# Patient Record
Sex: FEMALE | Race: BLACK OR AFRICAN AMERICAN | NOT HISPANIC OR LATINO | Employment: FULL TIME | URBAN - METROPOLITAN AREA
[De-identification: names, ages, dates, MRNs, and addresses within clinical notes are randomized per-mention and may not be internally consistent; named-entity substitution may affect disease eponyms.]

---
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
## 2021-02-05 ENCOUNTER — OFFICE VISIT - HEALTHEAST (OUTPATIENT)
Dept: FAMILY MEDICINE | Facility: CLINIC | Age: 21
End: 2021-02-05

## 2021-02-05 DIAGNOSIS — Z86.2 H/O: IRON DEFICIENCY ANEMIA: ICD-10-CM

## 2021-02-05 DIAGNOSIS — Z80.3 FAMILY HISTORY OF BREAST CANCER IN MOTHER: ICD-10-CM

## 2021-02-05 DIAGNOSIS — Z00.00 ROUTINE GENERAL MEDICAL EXAMINATION AT A HEALTH CARE FACILITY: ICD-10-CM

## 2021-02-05 DIAGNOSIS — F32.0 MILD MAJOR DEPRESSION (H): ICD-10-CM

## 2021-02-05 DIAGNOSIS — Z11.3 SCREEN FOR STD (SEXUALLY TRANSMITTED DISEASE): ICD-10-CM

## 2021-02-05 DIAGNOSIS — Z30.09 ENCOUNTER FOR COUNSELING REGARDING CONTRACEPTION: ICD-10-CM

## 2021-02-05 LAB
FASTING STATUS PATIENT QL REPORTED: NORMAL
GLUCOSE BLD-MCNC: 87 MG/DL (ref 74–125)
HGB BLD-MCNC: 13.2 G/DL (ref 12–16)

## 2021-02-05 RX ORDER — NORGESTIMATE AND ETHINYL ESTRADIOL 0.25-0.035
1 KIT ORAL DAILY
Qty: 3 PACKAGE | Refills: 4 | Status: SHIPPED | OUTPATIENT
Start: 2021-02-05 | End: 2022-04-27

## 2021-02-05 ASSESSMENT — ANXIETY QUESTIONNAIRES
4. TROUBLE RELAXING: NOT AT ALL
GAD7 TOTAL SCORE: 1
1. FEELING NERVOUS, ANXIOUS, OR ON EDGE: NOT AT ALL
5. BEING SO RESTLESS THAT IT IS HARD TO SIT STILL: NOT AT ALL
6. BECOMING EASILY ANNOYED OR IRRITABLE: NOT AT ALL
7. FEELING AFRAID AS IF SOMETHING AWFUL MIGHT HAPPEN: NOT AT ALL
IF YOU CHECKED OFF ANY PROBLEMS ON THIS QUESTIONNAIRE, HOW DIFFICULT HAVE THESE PROBLEMS MADE IT FOR YOU TO DO YOUR WORK, TAKE CARE OF THINGS AT HOME, OR GET ALONG WITH OTHER PEOPLE: NOT DIFFICULT AT ALL
3. WORRYING TOO MUCH ABOUT DIFFERENT THINGS: SEVERAL DAYS
2. NOT BEING ABLE TO STOP OR CONTROL WORRYING: NOT AT ALL

## 2021-02-05 ASSESSMENT — MIFFLIN-ST. JEOR: SCORE: 1345.81

## 2021-02-05 ASSESSMENT — PATIENT HEALTH QUESTIONNAIRE - PHQ9: SUM OF ALL RESPONSES TO PHQ QUESTIONS 1-9: 3

## 2021-02-09 LAB
C TRACH DNA SPEC QL PROBE+SIG AMP: NEGATIVE
N GONORRHOEA DNA SPEC QL NAA+PROBE: NEGATIVE

## 2021-02-15 ENCOUNTER — OFFICE VISIT - HEALTHEAST (OUTPATIENT)
Dept: ONCOLOGY | Facility: HOSPITAL | Age: 21
End: 2021-02-15

## 2021-02-15 DIAGNOSIS — Z71.83 ENCOUNTER FOR NONPROCREATIVE GENETIC COUNSELING: ICD-10-CM

## 2021-02-15 DIAGNOSIS — Z80.3 FAMILY HISTORY OF MALIGNANT NEOPLASM OF BREAST: ICD-10-CM

## 2021-03-09 ENCOUNTER — COMMUNICATION - HEALTHEAST (OUTPATIENT)
Dept: ONCOLOGY | Facility: HOSPITAL | Age: 21
End: 2021-03-09

## 2021-03-22 ENCOUNTER — COMMUNICATION - HEALTHEAST (OUTPATIENT)
Dept: FAMILY MEDICINE | Facility: CLINIC | Age: 21
End: 2021-03-22

## 2021-04-23 ENCOUNTER — OFFICE VISIT - HEALTHEAST (OUTPATIENT)
Dept: FAMILY MEDICINE | Facility: CLINIC | Age: 21
End: 2021-04-23

## 2021-04-23 DIAGNOSIS — T74.21XD SEXUAL ASSAULT OF ADULT, SUBSEQUENT ENCOUNTER: ICD-10-CM

## 2021-04-23 DIAGNOSIS — N89.8 VAGINAL ITCHING: ICD-10-CM

## 2021-04-23 DIAGNOSIS — N76.0 BV (BACTERIAL VAGINOSIS): ICD-10-CM

## 2021-04-23 DIAGNOSIS — B96.89 BV (BACTERIAL VAGINOSIS): ICD-10-CM

## 2021-04-23 DIAGNOSIS — F32.0 MILD MAJOR DEPRESSION (H): ICD-10-CM

## 2021-04-23 LAB
CLUE CELLS: ABNORMAL
TRICHOMONAS, WET PREP: ABNORMAL
YEAST, WET PREP: ABNORMAL

## 2021-04-23 RX ORDER — DOLUTEGRAVIR SODIUM 50 MG/1
50 TABLET, FILM COATED ORAL DAILY
Status: SHIPPED | COMMUNITY
Start: 2021-04-14 | End: 2022-04-27

## 2021-04-23 ASSESSMENT — ANXIETY QUESTIONNAIRES
3. WORRYING TOO MUCH ABOUT DIFFERENT THINGS: SEVERAL DAYS
4. TROUBLE RELAXING: NOT AT ALL
1. FEELING NERVOUS, ANXIOUS, OR ON EDGE: NOT AT ALL
2. NOT BEING ABLE TO STOP OR CONTROL WORRYING: SEVERAL DAYS
IF YOU CHECKED OFF ANY PROBLEMS ON THIS QUESTIONNAIRE, HOW DIFFICULT HAVE THESE PROBLEMS MADE IT FOR YOU TO DO YOUR WORK, TAKE CARE OF THINGS AT HOME, OR GET ALONG WITH OTHER PEOPLE: NOT DIFFICULT AT ALL
6. BECOMING EASILY ANNOYED OR IRRITABLE: SEVERAL DAYS
5. BEING SO RESTLESS THAT IT IS HARD TO SIT STILL: NOT AT ALL
7. FEELING AFRAID AS IF SOMETHING AWFUL MIGHT HAPPEN: SEVERAL DAYS
GAD7 TOTAL SCORE: 4

## 2021-04-23 ASSESSMENT — PATIENT HEALTH QUESTIONNAIRE - PHQ9: SUM OF ALL RESPONSES TO PHQ QUESTIONS 1-9: 8

## 2021-04-26 ENCOUNTER — COMMUNICATION - HEALTHEAST (OUTPATIENT)
Dept: FAMILY MEDICINE | Facility: CLINIC | Age: 21
End: 2021-04-26

## 2021-04-27 LAB
C TRACH DNA SPEC QL PROBE+SIG AMP: NEGATIVE
N GONORRHOEA DNA SPEC QL NAA+PROBE: NEGATIVE

## 2021-05-05 ENCOUNTER — AMBULATORY - HEALTHEAST (OUTPATIENT)
Dept: LAB | Facility: CLINIC | Age: 21
End: 2021-05-05

## 2021-05-05 DIAGNOSIS — T74.21XD SEXUAL ASSAULT OF ADULT, SUBSEQUENT ENCOUNTER: ICD-10-CM

## 2021-05-05 LAB — HIV 1+2 AB+HIV1 P24 AG SERPL QL IA: NEGATIVE

## 2021-05-06 LAB
HBV SURFACE AG SERPL QL IA: NEGATIVE
HCV AB SERPL QL IA: NEGATIVE
T PALLIDUM AB SER QL: NEGATIVE

## 2021-05-10 ENCOUNTER — COMMUNICATION - HEALTHEAST (OUTPATIENT)
Dept: FAMILY MEDICINE | Facility: CLINIC | Age: 21
End: 2021-05-10

## 2021-05-18 ENCOUNTER — COMMUNICATION - HEALTHEAST (OUTPATIENT)
Dept: FAMILY MEDICINE | Facility: CLINIC | Age: 21
End: 2021-05-18

## 2021-05-18 DIAGNOSIS — N76.0 BV (BACTERIAL VAGINOSIS): ICD-10-CM

## 2021-05-18 DIAGNOSIS — B96.89 BV (BACTERIAL VAGINOSIS): ICD-10-CM

## 2021-05-18 RX ORDER — METRONIDAZOLE 7.5 MG/G
GEL VAGINAL
Qty: 70 G | Refills: 3 | Status: SHIPPED | OUTPATIENT
Start: 2021-05-18 | End: 2022-04-27

## 2021-05-26 ASSESSMENT — PATIENT HEALTH QUESTIONNAIRE - PHQ9: SUM OF ALL RESPONSES TO PHQ QUESTIONS 1-9: 13

## 2021-05-27 ASSESSMENT — PATIENT HEALTH QUESTIONNAIRE - PHQ9
SUM OF ALL RESPONSES TO PHQ QUESTIONS 1-9: 3
SUM OF ALL RESPONSES TO PHQ QUESTIONS 1-9: 8
SUM OF ALL RESPONSES TO PHQ QUESTIONS 1-9: 7

## 2021-05-28 ASSESSMENT — ANXIETY QUESTIONNAIRES
GAD7 TOTAL SCORE: 8
GAD7 TOTAL SCORE: 1
GAD7 TOTAL SCORE: 7
GAD7 TOTAL SCORE: 4

## 2021-05-28 NOTE — PATIENT INSTRUCTIONS - HE
Dear Joni,    Irregular bleeding is the most common side effect of explained on contraceptive  But as the bleeding is little better than before and less painful I will still recommend continue the implant but for now we cannot try higher dose of estrogen 2 mg tablet 1 tablet every day for the next 10 days with 600 mg of IBU three times per day  For next 3-4 days and see if it stops the bleeding if none  the things working then we will remove the implant in next 2 weeks    Take iron supplement every day as hemoglobin still     Lab Results   Component Value Date    WBC 4.4 04/29/2019    HGB 11.1 (L) 04/29/2019    HCT 35.1 04/29/2019    MCV 72 (L) 04/29/2019     04/29/2019         Lulu Hawk MD 5/16/2019 8:48 AM     Oxybutynin Counseling:  I discussed with the patient the risks of oxybutynin including but not limited to skin rash, drowsiness, dry mouth, difficulty urinating, and blurred vision.

## 2021-05-28 NOTE — PATIENT INSTRUCTIONS - HE
Dear Joni,    Irregular bleeding is the most common side effect of explained on contraceptive  But as the bleeding is little better than before and less painful I will still recommend continue the implant but for now we cannot try estrogen tablet 1 tablet every day for the next 10 days and see if it stops the bleeding anytime you have heavy bleeding take some ibuprofen 600 mg every 6 hour and that also cut down the amount of bleeding if none  the things working then we will remove the implant in next 2 weeks    Take iron supplement every day as hemoglobin still     Lab Results   Component Value Date    WBC 4.4 04/29/2019    HGB 11.1 (L) 04/29/2019    HCT 35.1 04/29/2019    MCV 72 (L) 04/29/2019     04/29/2019         Lulu Hawk MD 4/29/2019 8:48 AM

## 2021-05-28 NOTE — PROGRESS NOTES
Assessment/plan   Joni Garcia is a 19 y.o. female who is  establish patient to my practice here with   Chief Complaint   Patient presents with     Follow-up     Nexplanon - pt was prescribed meds last visit to help control her bleeding w/o any relief, pt is still having extended menstruals        Joni was seen today for contraception.    Diagnoses and all orders for this visit:    Polymenorrhea  -     HM2(CBC w/o Differential)  -     Thyroid Stimulating Hormone (TSH)  -     estradiol (ESTRACE) 1 MG tablet; Take 1 tablet (1 mg total) by mouth daily for 10 days.    Nexplanon in place    Iron deficiency anemia due to chronic blood loss  Comments:  Prolong irregular bleeding       Patient Instructions       Dear Joni,    Irregular bleeding is the most common side effect of explained on contraceptive  But as the bleeding is little better than before and less painful I will still recommend continue the implant but for now we cannot try higher dose of estrogen 2 mg tablet 1 tablet every day for the next 10 days with 600 mg of IBU three times per day  For next 3-4 days and see if it stops the bleeding if none  the things working then we will remove the implant in next 2 weeks    Take iron supplement every day as hemoglobin still     Lab Results   Component Value Date    WBC 4.4 04/29/2019    HGB 11.1 (L) 04/29/2019    HCT 35.1 04/29/2019    MCV 72 (L) 04/29/2019     04/29/2019         Lulu Hawk MD 5/16/2019 8:48 AM          Subjective:      HPI: Joni Garcia is a 19 y.o. female is here for for f/u after we did 10 days of estrogen , for irregular bleeding since we placed the Nexplanon last year, she felt no side effect , feel bleeding did slow down little but still has to use 2 pads      Nexplanon was placed because of the heavy irregular painful cycle per patient they are little bit better than before but still spot every day, denies any heavy bleeding with clots only used one tampon every day, and no  dysmenorrhea.  Last hemoglobin when rechecked was 10.4 it did improve to 11.1 so most likely not very heavy bleeding  Patient currently not sexually active only purpose for Nexplanon was to control her bleeding and PMS symptoms     I have personally reviewed the patient's allergies, medications, past medical history, family history, social history, rooming notes and problem list in detail and updated the patient record as necessary.      No past medical history on file.  No past surgical history on file.  Patient has no known allergies.  Current Outpatient Medications   Medication Sig Dispense Refill     etonogestrel (NEXPLANON) 68 mg Impl implant 1 each by Subdermal route once.       estradiol (ESTRACE) 2 MG tablet Take 1 tablet (2 mg total) by mouth daily. 10 tablet 0     ferrous sulfate 325 (65 FE) MG tablet Take 1 tablet (325 mg total) by mouth daily with breakfast. 60 tablet 3     ibuprofen (ADVIL,MOTRIN) 600 MG tablet Take 1 tablet (600 mg total) by mouth 3 (three) times a day. 90 tablet 2     No current facility-administered medications for this visit.      No family history on file.    Patient Active Problem List   Diagnosis     Polymenorrhea     Anemia     Nexplanon insertion       Review of Systems   12 point comprehensive review of systems was negative except as noted and HPI     Social History     Social History Narrative    Dad- Alfonzo Wilhelm       Objective:     Vitals:    05/16/19 1526   BP: 108/66   Pulse: 76   Weight: 126 lb 6.4 oz (57.3 kg)       Physical Exam:   Physical Exam:  General Appearance:  Appears comfortable, Alert, cooperative, no distress,   Heart: Regular rate and rhythm, S1 and S2 normal, no murmur, rubs or gallop  Abdomen: Soft, non-tender, bowel sounds active all four quadrants,   no masses, no organomegaly  Extremities: Extremities normal, atraumatic, no cyanosis or edema  Pulses: DP pulses are 1-2+ bilat.    Skin: no rashes or lesions          This note has been  dictated using voice recognition software. Any grammatical or context distortions are unintentional and inherent to the software  Lulu Hawk MD

## 2021-05-28 NOTE — PROGRESS NOTES
Assessment/plan   Joni Garcia is a 19 y.o. female who is  establish patient to my practice here with   Chief Complaint   Patient presents with     Contraception     discuss options; currently on Nexplanon and has extended menstruals xmonths        Joni was seen today for contraception.    Diagnoses and all orders for this visit:    Polymenorrhea  -     HM2(CBC w/o Differential)  -     Thyroid Stimulating Hormone (TSH)  -     estradiol (ESTRACE) 1 MG tablet; Take 1 tablet (1 mg total) by mouth daily for 10 days.    Nexplanon in place    Iron deficiency anemia due to chronic blood loss  Comments:  Prolong irregular bleeding           Patient Instructions       Dear Joni,    Irregular bleeding is the most common side effect of explained on contraceptive  But as the bleeding is little better than before and less painful I will still recommend continue the implant but for now we cannot try estrogen tablet 1 tablet every day for the next 10 days and see if it stops the bleeding anytime you have heavy bleeding take some ibuprofen 600 mg every 6 hour and that also cut down the amount of bleeding if none of  the things working then we will remove the implant in next 2 weeks    Take iron supplement every day as hemoglobin still     Lulu Hawk MD 4/29/2019 8:48 AM      Subjective:      HPI: Joni Garcia is a 19 y.o. female is here for irregular bleeding since we placed the Nexplanon last year, Nexplanon was placed because of the heavy irregular painful cycle per patient they are little bit better than before but still spot every day, denies any heavy bleeding with clots only used one tampon every day, and no dysmenorrhea.  Last hemoglobin when rechecked was 10.4 it did improve to 11.1 so most likely not very heavy bleeding  Patient currently not sexually active only purpose for Nexplanon was to control her bleeding          I have personally reviewed the patient's allergies, medications, past medical history, family  history, social history, rooming notes and problem list in detail and updated the patient record as necessary.      No past medical history on file.  No past surgical history on file.  Patient has no known allergies.  Current Outpatient Medications   Medication Sig Dispense Refill     etonogestrel (NEXPLANON) 68 mg Impl implant 1 each by Subdermal route once.       estradiol (ESTRACE) 1 MG tablet Take 1 tablet (1 mg total) by mouth daily for 10 days. 10 tablet 11     ferrous sulfate 325 (65 FE) MG tablet Take 1 tablet (325 mg total) by mouth daily with breakfast. 60 tablet 3     No current facility-administered medications for this visit.      No family history on file.    Patient Active Problem List   Diagnosis     Polymenorrhea     Anemia     Nexplanon insertion       Review of Systems   12 point comprehensive review of systems was negative except as noted and HPI     Social History     Social History Narrative    Dad- Alfonzo Wilhelm       Objective:     Vitals:    04/29/19 0800   BP: 116/76   Pulse: 62   Weight: 123 lb 4.8 oz (55.9 kg)       Physical Exam:   Physical Exam:  General Appearance:  Appears comfortable, Alert, cooperative, no distress,   Heart: Regular rate and rhythm, S1 and S2 normal, no murmur, rubs or gallop  Abdomen: Soft, non-tender, bowel sounds active all four quadrants,   no masses, no organomegaly  Extremities: Extremities normal, atraumatic, no cyanosis or edema  Pulses: DP pulses are 1-2+ bilat.    Skin: no rashes or lesions          This note has been dictated using voice recognition software. Any grammatical or context distortions are unintentional and inherent to the software  Lulu Hawk MD

## 2021-05-30 VITALS — WEIGHT: 125.3 LBS | BODY MASS INDEX: 22.55 KG/M2

## 2021-05-31 VITALS — WEIGHT: 128.3 LBS

## 2021-05-31 NOTE — PROGRESS NOTES
Assessment/plan   Joni Garcia is a 19 y.o. female who is establish patient to my practice here with   Chief Complaint   Patient presents with     Contraception     nexplanon removal - prolonged menstrual since placement        Joni was seen today for contraception.    Diagnoses and all orders for this visit:    Nexplanon removal    Breakthrough bleeding on Nexplanon             Procedure Note Nexplanon removal:  Skin of left upper inner arm prepped in sterile fashion after consent form was signed and pt's questions were answered.  1% xylocaine with epinephrine used for local anesthesia.  Incision made with 15 blade at site of Nexplanon insertion. Implant was grasped with forceps and removed with minimal difficulty.  Pt tolerated procedure well, no complications.   A dressing was placed and she was instructed to leave it on for 24 hours and then remove it and treat this as normal skin.    Subjective:      HPI: Joni Garcia is a 19 y.o. female is here  to have Nexplanon removal, patient has this in place for last 6-month continued to bleed since then we tried ibuprofen, estrogen therapy but none of that able to help the symptoms.  Planning to move into dorm by end of the month and like to have it removed so she does not have to worry about having cycles every single day        I have personally reviewed the patient's allergies, medications, past medical history, family history, social history, rooming notes and problem list in detail and updated the patient record as necessary.      No past medical history on file.  No past surgical history on file.  Patient has no known allergies.  Current Outpatient Medications   Medication Sig Dispense Refill     ferrous sulfate 325 (65 FE) MG tablet Take 1 tablet (325 mg total) by mouth daily with breakfast. 60 tablet 3     ibuprofen (ADVIL,MOTRIN) 600 MG tablet Take 1 tablet (600 mg total) by mouth 3 (three) times a day. 90 tablet 2     No current facility-administered  medications for this visit.      No family history on file.    Patient Active Problem List   Diagnosis     Polymenorrhea     Anemia     Nexplanon insertion       Review of Systems   12 point comprehensive review of systems was negative except as noted and HPI     Social History     Social History Narrative    Dad- Alfonzo Wilhelm       Objective:     Vitals:    08/05/19 1154   BP: 122/84   Pulse: 68   Weight: 126 lb 9.6 oz (57.4 kg)       Physical Exam:   Physical Exam:  General Appearance:  Appears comfortable, Alert, cooperative, no distress,      This note has been dictated using voice recognition software. Any grammatical or context distortions are unintentional and inherent to the software  Lulu Hawk MD

## 2021-06-01 VITALS — WEIGHT: 126.9 LBS | BODY MASS INDEX: 22.48 KG/M2 | HEIGHT: 63 IN

## 2021-06-03 VITALS — WEIGHT: 126.6 LBS | BODY MASS INDEX: 22.72 KG/M2

## 2021-06-03 VITALS — BODY MASS INDEX: 22.12 KG/M2 | WEIGHT: 123.3 LBS

## 2021-06-03 VITALS — WEIGHT: 126.4 LBS | BODY MASS INDEX: 22.68 KG/M2

## 2021-06-04 VITALS
HEART RATE: 72 BPM | DIASTOLIC BLOOD PRESSURE: 66 MMHG | HEIGHT: 63 IN | BODY MASS INDEX: 22.73 KG/M2 | WEIGHT: 128.3 LBS | SYSTOLIC BLOOD PRESSURE: 100 MMHG

## 2021-06-04 VITALS — BODY MASS INDEX: 22.22 KG/M2 | WEIGHT: 127 LBS

## 2021-06-05 VITALS
BODY MASS INDEX: 22.53 KG/M2 | TEMPERATURE: 98.1 F | WEIGHT: 132 LBS | HEART RATE: 65 BPM | DIASTOLIC BLOOD PRESSURE: 62 MMHG | SYSTOLIC BLOOD PRESSURE: 90 MMHG | HEIGHT: 64 IN

## 2021-06-05 VITALS
SYSTOLIC BLOOD PRESSURE: 108 MMHG | DIASTOLIC BLOOD PRESSURE: 64 MMHG | BODY MASS INDEX: 23.28 KG/M2 | WEIGHT: 133.5 LBS | HEART RATE: 76 BPM

## 2021-06-05 NOTE — PROGRESS NOTES
Assessment/plan   Joni Garcia is a 19 y.o. female who is  establish patient to my practice here with   Chief Complaint   Patient presents with     Vaginal Discharge     and odor, lump on right side groin; Sx have been present since 09/2019 w/o relief and denies any urinary Sx         Joni was seen today for vaginal discharge.    Diagnoses and all orders for this visit:    Vaginal discharge  -     Wet Prep, Vaginal    Inguinal lymphadenopathy  Comments:  benign most likely from folliculitis from shaving     wet prep neg, preventive measures including probiotic etc. To help presumptive bacterial vaginosis based on symptoms   Adv to call when she has symptoms and not on her cycle so more likely diagnose       Subjective:      HPI: Joni Garcia is a 19 y.o. female is here for    Vaginitis: Patient complains of an abnormal vaginal discharge for several months symptoms are more prominent after her menstrual bleeding. Vaginal symptoms include local irritation and vulvar itching.Vulvar symptoms include local irritation and odor.STI Risk: Very low risk of STD exposure never sexually active Discharge described as: white, thin and malodorous.Other associated symptoms: none.Menstrual pattern: She had been bleeding regularly. Contraception: none is a concern about small inguinal lymph node on the right side most likely coming from her folliculitis from shaving nontender smooth mobile  Patient had history of continuous bleeding on nexplanon which got removed in last 2 month since then regular cycles     Patient's last menstrual period was 01/05/2020 (exact date).       I have personally reviewed the patient's allergies, medications, past medical history, family history, social history, rooming notes and problem list in detail and updated the patient record as necessary.      No past medical history on file.  No past surgical history on file.  Patient has no known allergies.  Current Outpatient Medications   Medication Sig  "Dispense Refill     ferrous sulfate 325 (65 FE) MG tablet Take 1 tablet (325 mg total) by mouth daily with breakfast. 60 tablet 3     No current facility-administered medications for this visit.      No family history on file.    Patient Active Problem List   Diagnosis     Polymenorrhea     Anemia     Nexplanon insertion       Review of Systems   12 point comprehensive review of systems was negative except as noted and HPI     Social History     Social History Narrative    Dominga- Alfonzo Wilhelm       Objective:     Vitals:    01/07/20 0902   BP: 100/66   Pulse: 72   Weight: 128 lb 4.8 oz (58.2 kg)   Height: 5' 3.39\" (1.61 m)       Physical Exam:   Physical Exam:  General Appearance:  Appears comfortable, Alert, cooperative, no distress,   Neck: Supple, symmetrical, trachea midline, no adenopathy;                      Lungs: Clear to auscultation bilaterally, respirations unlabored  Pelvic exam: VULVA: normal appearing vulva with no masses, tenderness or lesions, VAGINA: normal appearing vagina with normal color and discharge, no lesions, WET MOUNT done - actively bleeding today   Non tender 0.5X0.5 cm LN right inguinal area mobile   Heart: Regular rate and rhythm, S1 and S2 normal, no murmur, rubs or gallop  Abdomen: Soft, non-tender, bowel sounds active all four quadrants,   no masses, no organomegaly  Extremities: Extremities normal, atraumatic, no cyanosis or edema  Pulses: DP pulses are 1-2+ bilat.    Skin: no rashes or lesions  Neurologic: normal and equal strength bilat in upper and lower extremities        This note has been dictated using voice recognition software. Any grammatical or context distortions are unintentional and inherent to the software  Lulu Hawk MD      "

## 2021-06-07 NOTE — PROGRESS NOTES
"Assessment/plan   Joni Garcia is a 20 y.o. female  who is being evaluated via a billable telephone visit.      The patient has been notified of following:     \"This telephone visit will be conducted via a call between you and your physician/provider. We have found that certain health care needs can be provided without the need for a physical exam.  This service lets us provide the care you need with a short phone conversation.  If a prescription is necessary we can send it directly to your pharmacy.  If lab work is needed we can place an order for that and you can then stop by our lab to have the test done at a later time.    If during the course of the call the physician/provider feels a telephone visit is not appropriate, you will not be charged for this service.\"     Patient has given verbal consent to a Telephone visit? Yes    Chief Complaint   Patient presents with     Menstrual Problem     missed menses since 02/2020, not currently sexually active but possibly related to stress of mother recently passing and heavy school work load         Joni was seen today for menstrual problem.    Diagnoses and all orders for this visit:    Secondary amenorrhea  Year reassured the patient that it is normal to have irregular cycles on no cycle during acute stress and anxiety.  Whenever our clinic is open up for the regular visits we might bring her back and do some basic labs including thyroid hormone estrogen progesterone and hemoglobin etc. to figure out if she continues to have no cycle by them approximately  I am  expecting 3 to 6-month.    Acute reaction to stress  Advised to continue to work with the counselor at school if extra help needed she should also reach out to us and we can figure out either outpatient therapy or work with the     Follow-up in clinic visit in next 3 to 6-month  Subjective:      HPI: Joni Garcia is a 20 y.o. female who we talked over the phone over her current concerns   . "   Amenorrhea: Patient complains of amenorrhea.  Last year patient had Nexplanon for helping her heaviest menstrual cycles.  But inserted a helping it caused prolonged bleeding almost for 6-month for her we end up removing the Nexplanon in August 2019.  Per patient she did resume her normal cycle after that she currently at [employer] major.  A lot of stress at home as her mom just recently passed away from cancer, and also online learning during this pandemic is not easier on her she is struggling with chemistry so her counselor advised to drop out up the subject but she decided to continue the chemistry subject but will take her lab in different semester  Patient's last menstrual period was 02/06/2020 (approximate).  Patient never sexually active so no concern for pregnancy  When she does have regular cycle bleeding is moderate.  Periods were regular in the past occurring every 1 month. Patient has no relevant history of abnormal sexual development. Is there a chance of pregnancy. Factors that may be contributory to menstrual abnormalities include recent stressors work and mom recently passed away. Previous treatments for menstrual abnormalities include progesterone, not very effective.         I have personally  went over  patient's allergies, medications, past medical history, family history, social history, rooming notes and problem list in detail and updated the patient record as necessary.      No past medical history on file.  No past surgical history on file.  Patient has no known allergies.  No current outpatient medications on file.     No current facility-administered medications for this visit.      No family history on file.    Patient Active Problem List   Diagnosis     Polymenorrhea     Anemia     Nexplanon insertion       Review of Systems   12 point comprehensive review of systems was negative except as noted and HPI     Social History     Social History Narrative    Arminda Mejía     Joao Wilhelm       Objective:     Vitals:    04/14/20 1052   Weight: 127 lb (57.6 kg)      TT 15m  This note has been dictated using voice recognition software. Any grammatical or context distortions are unintentional and inherent to the software  Lulu Hawk MD

## 2021-06-07 NOTE — TELEPHONE ENCOUNTER
FYI - Status Update  Who is Calling: Patient  Update: Called in as she wants to change the time of her appointment today. [facility] is not allowing this PSR to change time as it has marked that patient has already arrived. Please call patient at [phone] to re-schedule/ change appointment time.  Okay to leave a detailed message?:  No

## 2021-06-08 NOTE — PROGRESS NOTES
Subjective:    HPI: Joni Garcia is a 16 y.o. female who presents today with mom.  She was seen in the ER was significant abdominal pain on January 26.  No cause was discovered.  Her abdominal and pelvic ultrasounds were both normal and her lab work was also normal.   She has no history of constipation or abdominal pain.  She is here for routine follow-up.  She has had no problems since she was discharged from the emergency room last week.  At her last physical she was complaining of having some mild cramps and irregular periods.  Since that time she feels like her dysmenorrhea and menstrual irregularities have worsened and she is interested in seeing someone about the possibility of birth control.  Mom is not quite convinced that this is what she would like to do but is interested in getting a second opinion.  We have discussed scheduling an appointment with one of our pediatricians for this and they are in agreement with that plan.          Review of Systems   Complete review of systems was performed and is negative except as was noted in the HPI.       Past Medical History   No past medical history on file.    Past Surgical History  No past surgical history on file.    Allergies  Review of patient's allergies indicates no known allergies.    Medications  No current outpatient prescriptions on file.     No current facility-administered medications for this visit.        Family History   No family history on file.    Social History   Social History     Social History Narrative    Dad- Alfonzo    Mom- Jennie    Smita       Problem List  Patient Active Problem List   Diagnosis     Dysmenorrhea     Polymenorrhea     Streptococcal Sore Throat     Anemia     Acute Pharyngitis     Avitaminosis D         Objective:      Vitals:    01/31/17 0807   Pulse: 64   Temp: 98.2  F (36.8  C)       Physical Exam   GENERAL: Alert and in no distress    GI: Soft with no hepatosplenomegaly masses or distention.  Good bowel sounds  are appreciated.  There is no pain elicited with her abdominal exam.      Assessment/Plan:      1. Abdominal pain-follow-up ED visit  Has been reassured she continues to have a normal exam.  In regards to her dysmenorrhea we will refer on for evaluation with one of our pediatricians and mom is in agreement with this plan.        Britt Kruse CNP  1/31/2017

## 2021-06-12 NOTE — PROGRESS NOTES
[facility] Pediatric Acute Visit     HPI:  Joni Garcia is a 17 y.o.  female who presents to the clinic with mom.  She works at a restaurant.  5 days ago while she was working she noticed some swelling on her left wrist and hand.  She was seen at urgent care and was given a wrist brace.  They thought it was a ganglion cyst.  It has gotten smaller since it was first noted 5 days ago but it is still there and they are here for further evaluation.  There is no pain and there has been no history of any injury.  She continues to have full range of motion of her hand and fingers.        Past Med / Surg History:  No past medical history on file.  No past surgical history on file.    Fam / Soc History:  No family history on file.  Social History     Social History Narrative    Dad- Alfonzo Horn- Jennie Wilhelm         ROS:  Gen: No fever or fatigue  Eyes: No eye discharge.   ENT: No nasal congestion or rhinorrhea. No pharyngitis. No otalgia.  Resp: No SOB, cough or wheezing.  GI:No diarrhea, nausea or vomiting  :No dysuria  MS: No joint/bone/muscle tenderness.  Skin: No rashes  Neuro: No headaches  Lymph/Hematologic: No gland swelling      Objective:  Vitals: Pulse 72  Temp 98.1  F (36.7  C) (Oral)   Wt 128 lb 4.8 oz (58.2 kg)    Gen: Alert, well appearing  Musculoskeletal: She is noted for some swelling over her wrist and hand.  This feels like a large ganglion cyst.  Joints with full range-of-motion. Normal upper and lower extremities.  Skin: Normal without lesions.  Neuro: Oriented. Normal reflexes; normal tone; no focal deficits appreciated. Appropriate for age.  Hematologic/Lymph/Immune: No cervical lymphadenopathy  Psychiatric: Appropriate affect      Pertinent results / imaging:  Reviewed     Assessment and Plan:    Joni Garcia is a 17  y.o. 4  m.o. female with:    1. Ganglion cyst  We will refer on to a hand specialist for further evaluation.  Mom agrees with this plan.  - Ambulatory referral to  Orthopedics      Britt Kruse CNP  8/3/2017

## 2021-06-15 NOTE — PROGRESS NOTES
2/15/2021     Joni Garcia is a 20 y.o. female who is being evaluated via a billable video visit.      How would you like to obtain your AVS? MyChart.  If dropped from the video visit, the video invitation should be resent by: Send to e-mail at: [email or web address]  Will anyone else be joining your video visit? No      Video Start Time: 12:00pm    HPI   Review of Systems  Physical Exam    Video-Visit Details    Type of service:  Video Visit    Video End Time (time video stopped): 12:45pm  Originating Location (pt. Location): Home    Distant Location (provider location):  [facility]     Platform used for Video Visit: [employer]    Referring Provider: Lulu Hawk MD    Present for Today's Visit: Joni    Presenting Information:   I met with Joni Garcia today for genetic counseling to discuss her family history of breast cancer.  She is here today to review this history, cancer screening recommendations, and available genetic testing options.    Personal History:  Joni is a 20 y.o. female. She does not have any personal history of cancer.       She had her first menstrual period at age 12, she does not currently have children, and is premenopausal.  Joni has her ovaries, fallopian tubes and uterus in place. She did have a pelvic US in 2017 due to pain that was normal.  She reports current history of oral contraceptive use (started about 1 week ago (past nexplanon use for 1 year) and that she has never been hormone replacement therapy.      She has never had a pap smear.  She has had one clinical breast exam at her recent physical and reports that she does not do regular self-exams. She has not had a colonoscopy.  She does not regularly do any other cancer screening at this time.  Joni reported no tobacco use, and rare/occasional alcohol use.    Family History: Joni's family history is significant for the following (Please see scanned pedigree for detailed family history  information)  Siblings    Joni has one full-sister, age 11, who has sickle cell anemia.     Joni has one paternal half-sister, age 13, who is healthy.   Maternal    Joni's mother passed away at age from breast cancer diagnosed at age 43. She reports that her mother also had a history of sickle cell anemia.     No cancer history reported in two maternal aunts, two maternal uncles, either of her maternal grandparents, or any of her maternal first-cousins.   Paternal    Joni's father, age 46, is reportedly healthy with no cancer history.     No cancer history reported in multiple paternal aunts and uncles, either of her paternal grandparents, or any of her paternal first-cousins.        Her maternal ethnicity is [place]. Her paternal ethnicity is [place].  There is no known Ashkenazi Faith ancestry on either side of her family. There is no reported consanguinity.    Discussion:    Joni's family history of breast cancer is suggestive of a hereditary cancer syndrome.    We reviewed the features of sporadic, familial, and hereditary cancers. In looking at Joni's family history, it is possible that a cancer susceptibility gene is present due to her mother's early-onset breast cancer.    We discussed the natural history and genetics of hereditary breast cancers. A detailed handout regarding the information we discussed will be sent to Joni via [employer]. Topics included: inheritance pattern, cancer risks, cancer screening recommendations, and also risks, benefits and limitations of testing.    We reviewed that the most common cause of hereditary breast cancer is Hereditary Breast and Ovarian Cancer (HBOC) syndrome, which is caused by mutations in the genes BRCA1 and BRCA2.  BRCA1 and BRCA2 are two genes that increase the risk for breast and ovarian cancers, among others. Women who inherit a BRCA mutation have a 50 to 85% lifetime risk of breast cancer and up to 40-58% lifetime risk of ovarian cancer. This is higher  than the general population lifetime risks of 12% for breast cancer and less than 2% for ovarian cancer. Men with BRCA gene mutations have up to a 7% risk of breast cancer and 20% risk of prostate cancer. Other cancers, such as pancreatic cancer and melanoma, have also been associated with BRCA mutations.    We discussed in detail the screening recommendations for those who have a mutation in the BRCA1/2 genes.     Based on her family history, Joni meets current National Comprehensive Cancer Network (NCCN) criteria for genetic testing of high-penetrance breast and/or ovarian cancer susceptibility genes.      We discussed that there are additional genes that could cause increased risk for breast cancer. As many of these genes present with overlapping features in a family and accurate cancer risk cannot always be established based upon the pedigree analysis alone, it would be reasonable for Joni to consider panel genetic testing to analyze multiple genes at once.    We reviewed genetic testing options for hereditary breast and gynecologic cancers: actionable high/moderate breast and gynecologic cancer risk custom panel (CustomNext-Cancer, 19 genes, a combination of BRCANext +  STK11) and expanded high and moderate risk panel (BRCANext-Expanded, 23 genes). Joni expressed an interest in learning as much information as possible from the testing. She opted for the BRCANext-Expanded panel.  Genetic testing is available for 23 genes associated with hereditary gynecologic, breast, and related cancers: BRCANext-Expanded (SELIN, BARD1, BRCA1, BRCA2, BRIP1, CDH1, CHEK2, DICER1, EPCAM, MLH1, MSH2, MSH6, NBN, NF1, PALB2, PMS2, PTEN, RAD51C, RAD51D, RECQL, SMARCA4, STK11, TP53).  We discussed that some of the genes in the BRCANext-Expanded panel are associated with specific hereditary cancer syndromes and published management guidelines: Hereditary Breast and Ovarian Cancer syndrome (BRCA1, BRCA2), Garcia syndrome (MLH1, MSH2,  MSH6, PMS2, EPCAM), Hereditary Diffuse Gastric Cancer (CDH1), Cowden syndrome (PTEN), Li Fraumeni syndrome (TP53), Peutz-Jeghers syndrome (STK11), and Neurofibromatosis type 1 (NF1).    Risk-reducing salpingo-oophorectomy can be considered in women with mutations in BRIP1, RAD51C, or RAD51D. Breast and/or other cancer risk management guidelines are available for SELIN, CHEK2, PALB2, NF1, and NBN.  The remaining genes (BARD1, DICER1, RECQL, and SMARCA4) are associated with increased cancer risk and may allow us to make medical recommendations when mutations are identified.      Consent was obtained over the phone with no witness required due to the current covid19 global pandemic.    Medical Management: For Joni, we reviewed that the information from genetic testing may determine:    additional cancer screening for which Joni may qualify (i.e. mammogram and breast MRI, more frequent colonoscopies, more frequent dermatologic exams, etc.),    options for risk reducing surgeries Joni could consider (i.e. bilateral mastectomy, surgery to remove her ovaries and/or uterus, etc.),      and targeted chemotherapies for Joni if she were to develop certain cancers in the future (i.e. immunotherapy for individuals with Garcia syndrome, PARP inhibitors, etc.).     These recommendations and possible targeted chemotherapies will be discussed in detail once genetic testing is completed.    Joni expressed wanting to know what her insurance will cover before proceeding with genetic testing. I shared that I will submit a prior authorization through [facility] to her insurance. As soon as a determination of benefits is received, she will be contacted to discuss next steps.     Plan:  1) Today  Joni elected to proceed with submitting a prior authorization for BRCANext-Expanded genetic testing (23genes) through [facility].  2) This information should be available in approximately 2-4 weeks.  3) I will be notified by the  laboratory regarding the prior authorization determination. I will contact  Joni with this information and to discuss next steps.     Raquel Rodriguez MS, [facility]  Licensed Genetic Counselor  [facility]  [phone]

## 2021-06-15 NOTE — PATIENT INSTRUCTIONS - HE
Oral contraceptive Start Advise:      The use of the oral contraceptive has been fully discussed with the patient. This includes the proper method to initiate (i.e. Sunday start after next normal menstrual onset) and continue the pills, the need for regular compliance to ensure adequate contraceptive effect, the physiology which make the pill effective, the instructions for what to do in event of a missed pill, and warnings about anticipated minor side effects such as breakthrough spotting, nausea, breast tenderness, weight changes, acne, headaches, etc. She has been told of the more serious potential side effects such as MI, stroke, and deep vein thrombosis, all of which are very unlikely. She has been asked to report any signs of such serious problems immediately. She should back up the pill with a condom during any cycle in which antibiotics are prescribed, and during the first cycle as well. The need for additional protection, such as a condom, to prevent exposure to sexually transmitted diseases has also been discussed- the patient has been clearly reminded that OCP's cannot protect her against diseases such as HIV and others. She understands and wishes to take the medication as prescribed.       To start advised:   1. Use a Sunday start method, ie: start the 1st Sunday of your period   2. Use a back up form of birth control, (condoms):   During the first week   During any break-through bleeding   During antibiotic use and for an additional week   If you have severe diarrhea or any vomiting within 2 hours of taking your pill, consider it lost and use condoms immediately and to the end of the pack and into your next pack for the 1st week   If you miss taking your pill, use condoms immediately for a week.   3. Please call if you have any questions regarding your pill use.

## 2021-06-15 NOTE — TELEPHONE ENCOUNTER
Phone call to Joni to let her know that her insurance is out-of-network with [facility], and her insurance policy has no out-of-network benefits. I shared that she could move forward with testing through [facility] with their patient pay option ($249) or we could submit another prior authorization to [facility] to see if her insurance would cover testing through that lab. She opted to run a prior auth through [facility]. I will get back to her as soon as I hear back from [facility].     Raquel Rodriguez MS, [facility]  Licensed Genetic Counselor  [facility]  [phone]

## 2021-06-16 PROBLEM — F32.0 MILD MAJOR DEPRESSION (H): Status: ACTIVE | Noted: 2021-02-05
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
## 2021-06-17 NOTE — PROGRESS NOTES
[facility] Well Child Check    ASSESSMENT & PLAN  Joni Garcia is a 18  y.o. 0  m.o. who has normal growth and normal development.    Diagnoses and all orders for this visit:    Encounter for routine child health examination with abnormal findings  -     Hemoglobin    Poor vision  Comments:  patient forgot her glasses today .    Decreased hearing of both ears  Comments:  serous fluid both ear drum ,adv claritin for few wk     Birth control counseling  Comments:  patient agree to do nexplanon today.    Nexplanon insertion    Other orders  -     Tdap vaccine greater than or equal to 6yo IM  -     Meningococcal MCV4P         Joni Garcia is a 18 y.o. female who presents for contraception counseling.  Main concern of  dysmenorrhea and menstrual irregularities which have gotten worsened and like to go on possibility of birth control. The patient is not currently sexually active. Pertinent past medical history: none.      Nexplanon insertion    Written consent done , risk and benefit discussed including irregular bleeding and also using protection for next 2 wk before it will be effective     Procedure Details   Urine pregnancy test was not done as currently  On her cycle and prior and no unprotected sex prior to that  .  The risks (including infection, bleeding, pain, ) and benefits of the procedure were explained to the patient and Written informed consent was obtained.      Skin was cleansed with Betadine.  Nexplanon inserted per package instruction no complications . Dressed with sterile strips  Patient tolerated procedure well.    Call or return to clinic prn if any concerns or questions .    Lulu Hawk MD 4/11/2018 11:59 AM          Return to clinic in 1 year for a Well Child Check or sooner as needed    IMMUNIZATIONS/LABS  Immunizations were reviewed and orders were placed as appropriate. and I have discussed the risks and benefits of all of the vaccine components with the patient/parents.  All questions  have been answered.    REFERRALS  Dental:  Recommend routine dental care as appropriate., Recommended that the patient establish care with a dentist.  Other:  No additional referrals were made at this time.    ANTICIPATORY GUIDANCE  I have reviewed age appropriate anticipatory guidance.  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 No    Do you have any forms that need to be filled out? No        Do you have any significant health concerns in your family history?: Yes: Mother has Sickle Cell  No family history on file.  Since your last visit, have there been any major changes in your family, such as a move, job change, separation, divorce, or death in the family?: No  Has a lack of transportation kept you from medical appointments?: No    Home  Who lives in your home?:  Dad no longer lives at home  Social History     Social History Narrative    Dad- Alfonzo    Mom- Jennie Wilhelm     Do you have any concerns about losing your housing?: No  Is your housing safe and comfortable?: No  Do you have any trouble with sleep?:  No    Education  What school do you child attend?:  [employer]  What grade are you in?:  11th  How do you perform in school (grades, behavior, attention, homework?: good     Eating  Do you eat regular meals including fruits and vegetables?:  yes, fruit but rarely vegetables  What are you drinking (cow's milk, water, soda, juice, sports drinks, energy drinks, etc)?: water, soda, juice, sports drinks and occasionally 1% milk  Have you been worried that you don't have enough food?: No  Do you have concerns about your body or appearance?:  No    Activities  Do you have friends?:  yes  Do you get at least one hour of physical activity per day?:  no  How many hours a day are you in  "front of a screen other than for schoolwork (computer, TV, phone)?:  All day  What do you do for exercise?:  none  Do you have interest/participate in community activities/volunteers/school sports?:  none    MENTAL HEALTH SCREENING  PHQ-2 Total Score: 2 (4/11/2018 11:00 AM)  PHQ-9 Total Score: 7 (4/11/2018 11:00 AM)    VISION/HEARING  Vision: Completed. See Results. Patient typically wears glasses but does not have them on today.  Hearing:  Completed. See Results     Hearing Screening    125Hz 250Hz 500Hz 1000Hz 2000Hz 3000Hz 4000Hz 6000Hz 8000Hz   Right ear:   40 20 20  0 0    Left ear:   25 20 20  0 0       Visual Acuity Screening    Right eye Left eye Both eyes   Without correction: 20/100 0 20/100   With correction:      Comments: Patient typically wears glasses and does not have them with today.      TB Risk Assessment:  The patient and/or parent/guardian answer positive to:  patient and/or parent/guardian answer 'no' to all screening TB questions    Dyslipidemia Risk Screening  Have either of your parents or any of your grandparents had a stroke or heart attack before age 55?: No  Any parents with high cholesterol or currently taking medications to treat?: No     Dental  When was the last time you saw the dentist?: 2013   Fluoride varnish application risks and benefits discussed and verbal consent was received. Application completed today in clinic.  Fluoride not applied today.  Last fluoride varnish application was within the past 3 months.      Patient Active Problem List   Diagnosis     Dysmenorrhea     Polymenorrhea     Streptococcal Sore Throat     Anemia     Acute Pharyngitis     Avitaminosis D     Ganglion cyst       Drugs  Does the patient use tobacco/alcohol/drugs?:  no    Safety  Does the patient have any safety concerns (peer or home)?:  no  Does the patient use safety belts, helmets and other safety equipment?:  yes    Sex  Have you ever had sex?:  No    MEASUREMENTS  Height:  5' 2.6\" (1.59 " m)  Weight: 126 lb 14.4 oz (57.6 kg)  BMI: Body mass index is 22.77 kg/(m^2).  Blood Pressure: 94/66  Blood pressure percentiles are 6 % systolic and 51 % diastolic based on NHBPEP's 4th Report. Blood pressure percentile targets: 90: 124/80, 95: 128/84, 99 + 5 mmH/96.    PHYSICAL EXAM  GEN: alert, well appearing  EYES: clear  R EAR: canal clear, TM pearly gray  L EAR: canal clear, TM pearly gray  NOSE: clear  OROPHARYNX: clear  NECK: supple, no significant LAD  CVS: RRR, no murmur  LUNGS: clear, no increased work of breathing  ABD: soft, non-tender, non-distended  EXT: warm, well perfused, no swelling  MSK: nl muscle bulk, spine straight  NEURO: CN grossly intact, nl strength in UE and LE, nl gait, no dysmetria  SKIN: clear

## 2021-06-21 NOTE — LETTER
Letter by Raquel Rodriguez, Genetic Counselor at      Author: Raquel Rodriguez, Genetic Counselor Service: -- Author Type: --    Filed:  Encounter Date: 2/15/2021 Status: (Other)       [facility]  Hereditary Breast and Gynecologic Cancers  Assessing Cancer Risk  Only about 5-10% of cancers are thought to be due to an inherited cancer susceptibility gene.    These families often have:    Several people with the same or related types of cancer    Cancers diagnosed at a young age (before age 50)    Individuals with more than one primary cancer    Multiple generations of the family affected with cancer    Some people may be candidates for genetic testing of more than one gene.  For these families, genetic testing using a cancer panel may be offered.  These panels will test different genes known to increase the risk for breast, ovarian, uterine, and/or other cancers. All of the genes discussed below have published clinical management guidelines for individuals who are found to carry a mutation. The purpose of this handout is to serve as a brief summary of the genes analyzed by the panels used to inquire about hereditary breast and gynecologic cancer:  SELIN, BRCA1, BRCA2, BRIP1, CDH1, CHEK2, MLH1, MSH2, MSH6, PMS2, EPCAM, PTEN, PALB2, RAD51C, RAD51D, and TP53.  ______________________________________________________________________________  Hereditary Breast and Ovarian Cancer Syndrome   (BRCA1 and BRCA2)  A single mutation in one of the copies of BRCA1 or BRCA2 increases the risk for breast and ovarian cancer, among others.  The risk for pancreatic cancer and melanoma may also be slightly increased in some families.  The chart below shows the chance that someone with a BRCA mutation would develop cancer in his or her lifetime1,2,3,4.      Lifetime Cancer Risks    General Population BRCA   Breast 12% ~80%   Ovarian 1-2% 11-40%   Male Breast <1% 7-8%   Prostate 16% 20%       A persons ethnic background is  also important to consider, as individuals of Ashkenazi [place] ancestry have a higher chance of having a BRCA gene mutation.  There are three BRCA mutations that occur more frequently in this population.    Garcia Syndrome   (MLH1, MSH2, MSH6, PMS2, and EPCAM)  Currently five genes are known to cause Garcia Syndrome: MLH1, MSH2, MSH6, PMS2, and EPCAM.  A single mutation in one of the Agrcia Syndrome genes increases the risk for colon, endometrial, ovarian, and stomach cancers.  Other cancers that occur less commonly in Garcia Syndrome include urinary tract, skin, and brain cancers.  The chart below shows the chance that a person with Garcia syndrome would develop cancer in his or her lifetime5.      Lifetime Cancer Risks    General Population Garcia Syndrome   Colon 5.5% ~80%   Endometrial 2.7% 15-60%   Stomach <1% 1-13%   Ovarian 1-2% 4-24%       Cowden Syndrome   (PTEN)  Cowden syndrome is a hereditary condition that increases the risk for breast, thyroid, endometrial, colon, and kidney cancer.  Cowden syndrome is caused by a mutation in the PTEN gene.  A single mutation in one of the copies of PTEN causes Cowden syndrome and increases cancer risk.  The chart below shows the chance that someone with a PTEN mutation would develop cancer in their lifetime6,7.  Other benign features seen in some individuals with Cowden syndrome include benign skin lesions (facial papules, keratoses, lipomas), learning disability, autism, thyroid nodules, colon polyps, and larger head size.      Lifetime Cancer Risks    General Population Cowden Synrome   Breast 12% 25-50%   Thyroid 1% Up to 35%   Renal 1-2% Up to 35%   Endometrial 2.7% Up to 28%   Colon  5.5% 9%   Melanoma 2-3% 6%   ** One recent study found breast cancer risk to be increased to 85%     Li-Fraumeni Syndrome   (TP53)  Li-Fraumeni Syndrome (LFS) is a cancer predisposition syndrome caused by a mutation in the TP53 gene. A single mutation in one of the copies of TP53 increases  the risk for multiple cancers. Individuals with LFS are at an increased risk for developing cancer at a young age. The lifetime risk for development of a LFS-associated cancer is 50% by age 30 and 90% by age 60.   Core Cancers: Sarcomas, Breast, Brain, Lung, Leukemias/Lymphomas, Adrenocortical carcinomas  Other Cancers: Gastrointestinal, Thyroid, Skin, Genitourinary    Hereditary Diffuse Gastric Cancer   (CDH1)  Currently, one gene is known to cause hereditary diffuse gastric cancer (HDGC): CDH1.  Individuals with HDGC are at increased risk for diffuse gastric cancer and lobular breast cancer. Of people diagnosed with HDGC, 30-50% have a mutation in the CDH1 gene.  This suggests there are likely other genes that may cause HDGC that have not been identified yet.      Lifetime Cancer Risks    General Population HDGC    Diffuse Gastric  <1% ~80%   Breast 12% 39-52%         Additional Genes  SELIN  SELIN is a moderate-risk breast cancer gene. Women who have a mutation in SELIN can have between a 2-4 fold increased risk for breast cancer compared to the general population8. SELIN mutations have also been associated with increased risk for pancreatic cancer, however an estimate of this cancer risk is not well understood9. Individuals who inherit two SELIN mutations have a condition called ataxia-telangiectasia (AT).  This rare autosomal recessive condition affects the nervous system and immune system, and is associated with progressive cerebellar ataxia beginning in childhood.  Individuals with ataxia-telangiectasia often have a weakened immune system and have an increased risk for childhood cancers.    PALB2  Mutations in PALB2 have been shown to increase the risk of breast cancer up to 33-58% in some families; where individuals fall within this risk range is dependent upon family [id number]. PALB2 mutations have also been associated with increased risk for pancreatic cancer, although this risk has not been quantified yet.   Individuals who inherit two PALB2 mutations--one from their mother and one from their father--have a condition called Fanconi Anemia.  This rare autosomal recessive condition is associated with short stature, developmental delay, bone marrow failure, and increased risk for childhood cancers.    CHEK2   CHEK2 is a moderate-risk breast cancer gene.  Women who have a mutation in CHEK2 have around a 2-fold increased risk for breast cancer compared to the general population, and this risk may be higher depending upon family history.11,12,13 Mutations in CHEK2 have also been shown to increase the risk of a number of other cancers, including colon and prostate, however these cancer risks are currently not well understood.    BRIP1, RAD51C and RAD51D  Mutations in BRIP1, RAD51C, and RAD51D have been shown to increase the risk of ovarian cancer and possibly female breast cancer as well14,15 .       Lifetime Cancer Risk    General Population BRIP1 RAD51C RAD51D   Ovarian 1-2% ~5-8% ~5-9% ~7-15%         Inheritance  All of the cancer syndromes reviewed above are inherited in an autosomal dominant pattern.  This means that if a parent has a mutation, each of his or her children will have a 50% chance of inheriting that same mutation.  Therefore, each child--male or female--would have a 50% chance of being at increased risk for developing cancer.    Mutations in some genes can occur de shelia, which means that a persons mutation occurred for the first time in them and was not inherited from a parent.  Now that they have the mutation, however, it can be passed on to future generations.    Genetic Testing  Genetic testing involves a blood test and will look at the genetic information in the SELIN, BRCA1, BRCA2, BRIP1, CDH1, CHEK2, MLH1, MSH2, MSH6, PMS2, EPCAM, PTEN, PALB2, RAD51C, RAD51D, and TP53 genes for any harmful mutations that are associated with increased cancer risk.  If possible, it is recommended that the person(s) who has  had cancer be tested before other family members.  That person will give us the most useful information about whether or not a specific gene is associated with the cancer in the family.    Results  There are three possible results of genetic testing:    Positive--a harmful mutation was identified in one or more of the genes    Negative--no mutation was identified in any of the genes on this panel    Variant of unknown significance--a variation in one of the genes was identified, but it is unclear how this impacts cancer risk in the family    Advantages and Disadvantages   There are advantages and disadvantages to genetic testing.    Advantages    May clarify your cancer risk    Can help you make medical decisions    May explain the cancers in your family    May give useful information to your family members (if you share your results)    Disadvantages    Possible negative emotional impact of learning about inherited cancer risk    Uncertainty in interpreting a negative test result in some situations    Possible genetic discrimination concerns (see below)    Genetic Information Nondiscrimination Act (NANY)  NANY is a federal law that protects individuals from health insurance or employment discrimination based on a genetic test result alone.  Although rare, there are currently no legal discrimination protections in terms of life insurance, long term care, or disability insurances.  Visit the National Human Genome Research [place] website to learn more.    Reducing Cancer Risk  All of the genes described above have nationally recognized cancer screening guidelines that would be recommended for individuals who test positive.  In addition to increased cancer screening, surgeries may be offered or recommended to reduce cancer risk.  Recommendations are based upon an individuals genetic test result as well as their personal and family history of cancer.    Questions to Think About Regarding Genetic Testing:    What  effect will the test result have on me and my relationship with my family members if I have an inherited gene mutation?  If I dont have a gene mutation?    Should I share my test results, and how will my family react to this news, which may also affect them?    Are my children ready to learn new information that may one day affect their own health?    Hereditary Cancer Resources    FORCE: Facing Our Risk of Cancer Empowered facingourrisk.org   Bright Pink bebrightpink.org   Li-Fraumeni Syndrome Association lfsassociation.org   PTEN World PTENworld.com   No stomach for cancer, Inc. nostomachforcancer.org   Stomach cancer relief network Scrnet.org   Collaborative Group of the Americas on Inherited Colorectal Cancer (CGA) cgaicc.com    Cancer Care cancercare.org   American Cancer Society (ACS) cancer.org   National Cancer [place] (NCI) cancer.gov     Please call us if you have any questions or concerns.   Cancer Risk Management Program  q Vicente Yu, MS, [facility] [phone]  q Rita Silverio, MS, [facility] [phone]  q Libia Brand, MS, [facility] [phone]  q Amanda Winters, MS, [facility] [phone]  q Raquel Rodriguez, MS, [facility] [phone]  q Gaudencio Taylor, MS, [facility] [phone]  q Odette Melara, MS, [facility] [phone]      References  1. Kimo Ortega PDP, Raegan S, Holly BRIGHT, Shivani JE, Tati JL, Josephineman N, Kalyn H, Mohit O, Justina A, Gracie B, Ai P, Farheen S, Adarsh DM, Helms N, Darek E, Lavonne H, Powell E, Courtneyinski J, Gronraúl J, Sara B, Mackenzie H, Erasmolatremaine S, Carmen H, Milton H, Casie K, Moraima OP. Average risks of breast and ovarian cancer associated with BRCA1 or BRCA2 mutations detected in case series unselected for family history: a combined analysis of 222 studies. Am J Hum Maura. 2003;72:1117-30.  2. Wade Pottery M, Fredis LAWLER.  BRCA1 and BRCA2 Hereditary Breast and Ovarian Cancer. Gene Reviews online. 2013.  3. Ramirez YC, Rachael S, Martina G, Esther S. Breast cancer risk among male BRCA1 and  BRCA2 mutation carriers. J Natl Cancer Inst. 2007;99:1811-4.  4. Gildardo ROCA, Jonelle I, Tono J, Hansel E, Johnathan ER, Gris F. Risk of breast cancer in male BRCA2 carriers. J Med Maura. 2010;47:710-1.  5. National Comprehensive Cancer Network. Clinical practice guidelines in oncology, colorectal cancer screening. Available online (registration required). 2015.  6. Loomis MH, Preeti J, Filemon J, Richar MANRIQUEZ, Swapna MS, Maisha C. Lifetime cancer risks in individuals with germline PTEN mutations. Clin Cancer Res. 2012;18:400-7.  7. Pilarski R. Cowden Syndrome: A Critical Review of the Clinical Literature. J Maura . 2009:18:13-27.  8. Aria FLEMING, Margarito D, Karlos S, Nkechi P, Jasson T, Flynn M, To B, Hugo H, Feliz R, Katey K, Yaw L, Gildardo ROCA, Adarsh D, Sharif DF, Valentin MR, The Breast Cancer Susceptibility Collaboration ([place]) & Geovanna BOUDREAUX. SELIN mutations that cause ataxia-telangiectasia are breast cancer susceptibility alleles. Nature Genetics. 2006;38:873-875  9. Zane N , Luis Y, Irma J, Lucita L, Pau GM , Emma ML, Gallinger S, Stafford AG, Syngal S, Juliano ML, Brendon J , Pretty R, Zeenat SZ, Esnikolai JR, Dann VE, Casie M, Vogelstein B, Samantha N, Micaelan RH, Ml KW, and Santos AP. SELIN mutations in patients with hereditary pancreatic cancer. Cancer Discover. 2012;2:41-46  10. Duke SMITH, et al. Breast-Cancer Risk in Families with Mutations in PALB2. NEJM. 2014; 371(6):497-506.  11. CHEK2 Breast Cancer Case-Control Consortium. CHEK2*1100delC and susceptibility to breast cancer: A collaborative analysis involving 10,860 breast cancer cases and 9,065 controls from 10 studies. Am J Hum Maura, 74 (2004), pp. 1019-4848  12. Karen T, Cary S, Patrick K, et al. Spectrum of Mutations in BRCA1, BRCA2, CHEK2, and TP53 in Families at High Risk of Breast Cancer. APARNA. 2006;295(12):8537-1500.   13. Lisandra BABIN, Andrez NIEVES, Cecilia FLEMING, et al. Risk of breast cancer in women with a  CHEK2 mutation with and without a family history of breast cancer. J Clin Oncol. 2011;29:2699-3075.  14. Rodger H, Romero E, Jasen SJ, et al. Contribution of germline mutations in the RAD51B, RAD51C, and RAD51D genes to ovarian cancer in the population. J Clin Oncol. 2015;33(26):0739-3609. Doi:10.1200/JCO.2015.61.2408.  15. Sacha T, Saleem VAZ, Maksim P, et al. Mutations in BRIP1 confer high risk of ovarian cancer. Shalini Maura. 2011;43(11):5274-7338. doi:10.1038/ng.955.

## 2021-06-21 NOTE — LETTER
Letter by Raquel Rodriguez Genetic Counselor at      Author: Raquel Rodriguez, Genetic Counselor Service: -- Author Type: --    Filed:  Encounter Date: 2/15/2021 Status: (Other)         Lulu Hawk MD  [address] [facility] [address]                                  February 15, 2021    Patient: Joni Garcia   MR Number: [id number]   YOB: 2000   Date of Visit: 2/15/2021     Dear Dr. Carine MD:    Thank you for referring Joni Garcia to me for evaluation. Below are the relevant portions of my assessment and plan of care.    If you have questions, please do not hesitate to call me. I look forward to following Joni along with you.    Sincerely,        Raquel Rodriguez Genetic Counselor          CC  No Recipients  Raquel Rodriguez, Genetic Counselor  2/15/2021  2:09 PM  Incomplete  2/15/2021     Joni Garcia is a 20 y.o. female who is being evaluated via a billable video visit.      How would you like to obtain your AVS? MyChart.  If dropped from the video visit, the video invitation should be resent by: Send to e-mail at: [email or web address]  Will anyone else be joining your video visit? No    Video Start Time: 12:00pm    [facility]   Review of Systems  Physical Exam    Video-Visit Details    Type of service:  Video Visit    Video End Time (time video stopped): 12:45pm  Originating Location (pt. Location): Home    Distant Location (provider location):  [facility]     Platform used for Video Visit: [facility]    Referring Provider: Lulu Hawk MD    Present for Today's Visit: Joni    Presenting Information:   I met with Joni Garcia today for genetic counseling to discuss her family history of breast cancer.  She is here today to review this history, cancer screening recommendations, and available genetic testing options.    Personal History:  Joni is a 20 y.o. female. She does not have any personal history of cancer.       She had her first menstrual period at age 12,  she does not currently have children, and is premenopausal.  Joni has her ovaries, fallopian tubes and uterus in place. She did have a pelvic US in 2017 due to pain that was normal.  She reports current history of oral contraceptive use (started about 1 week ago (past nexplanon use for 1 year) and that she has never been hormone replacement therapy.      She has never had a pap smear.  She has had one clinical breast exam at her recent physical and reports that she does not do regular self-exams. She has not had a colonoscopy.  She does not regularly do any other cancer screening at this time.  Joni reported no tobacco use, and rare/occasional alcohol use.    Family History: Joni's family history is significant for the following (Please see scanned pedigree for detailed family history information)  Siblings    Joni has one full-sister, age 11, who has sickle cell anemia.     Joni has one paternal half-sister, age 13, who is healthy.   Maternal    Joni's mother passed away at age from breast cancer diagnosed at age 43. She reports that her mother also had a history of sickle cell anemia.     No cancer history reported in two maternal aunts, two maternal uncles, either of her maternal grandparents, or any of her maternal first-cousins.   Paternal    Joni's father, age 46, is reportedly healthy with no cancer history.     No cancer history reported in multiple paternal aunts and uncles, either of her paternal grandparents, or any of her paternal first-cousins.        Her maternal ethnicity is [place]. Her paternal ethnicity is [place].  There is no known Ashkenazi Moravian ancestry on either side of her family. There is no reported consanguinity.    Discussion:    Joni's family history of breast cancer is suggestive of a hereditary cancer syndrome.    We reviewed the features of sporadic, familial, and hereditary cancers. In looking at Joni's family history, it is possible that a cancer susceptibility gene is  present due to her mother's early-onset breast cancer.    We discussed the natural history and genetics of hereditary breast cancers. A detailed handout regarding the information we discussed will be sent to Joni via [employer]. Topics included: inheritance pattern, cancer risks, cancer screening recommendations, and also risks, benefits and limitations of testing.    We reviewed that the most common cause of hereditary breast cancer is Hereditary Breast and Ovarian Cancer (HBOC) syndrome, which is caused by mutations in the genes BRCA1 and BRCA2.  BRCA1 and BRCA2 are two genes that increase the risk for breast and ovarian cancers, among others. Women who inherit a BRCA mutation have a 50 to 85% lifetime risk of breast cancer and up to 40-58% lifetime risk of ovarian cancer. This is higher than the general population lifetime risks of 12% for breast cancer and less than 2% for ovarian cancer. Men with BRCA gene mutations have up to a 7% risk of breast cancer and 20% risk of prostate cancer. Other cancers, such as pancreatic cancer and melanoma, have also been associated with BRCA mutations.    We discussed in detail the screening recommendations for those who have a mutation in the BRCA1/2 genes.     Based on her family history, Joni meets current National Comprehensive Cancer Network (NCCN) criteria for genetic testing of high-penetrance breast and/or ovarian cancer susceptibility genes.      We discussed that there are additional genes that could cause increased risk for breast cancer. As many of these genes present with overlapping features in a family and accurate cancer risk cannot always be established based upon the pedigree analysis alone, it would be reasonable for Joni to consider panel genetic testing to analyze multiple genes at once.    We reviewed genetic testing options for hereditary breast and gynecologic cancers: actionable high/moderate breast and gynecologic cancer risk custom panel  (CustomNext-Cancer, 19 genes, a combination of BRCANext +  STK11) and expanded high and moderate risk panel (BRCANext-Expanded, 23 genes). Joni expressed an interest in learning as much information as possible from the testing. She opted for the BRCANext-Expanded panel.  Genetic testing is available for 23 genes associated with hereditary gynecologic, breast, and related cancers: BRCANext-Expanded (SELIN, BARD1, BRCA1, BRCA2, BRIP1, CDH1, CHEK2, DICER1, EPCAM, MLH1, MSH2, MSH6, NBN, NF1, PALB2, PMS2, PTEN, RAD51C, RAD51D, RECQL, SMARCA4, STK11, TP53).  We discussed that some of the genes in the BRCANext-Expanded panel are associated with specific hereditary cancer syndromes and published management guidelines: Hereditary Breast and Ovarian Cancer syndrome (BRCA1, BRCA2), Garcia syndrome (MLH1, MSH2, MSH6, PMS2, EPCAM), Hereditary Diffuse Gastric Cancer (CDH1), Cowden syndrome (PTEN), Li Fraumeni syndrome (TP53), Peutz-Jeghers syndrome (STK11), and Neurofibromatosis type 1 (NF1).    Risk-reducing salpingo-oophorectomy can be considered in women with mutations in BRIP1, RAD51C, or RAD51D. Breast and/or other cancer risk management guidelines are available for SELIN, CHEK2, PALB2, NF1, and NBN.  The remaining genes (BARD1, DICER1, RECQL, and SMARCA4) are associated with increased cancer risk and may allow us to make medical recommendations when mutations are identified.      Consent was obtained over the phone with no witness required due to the current covid19 global pandemic.    Medical Management: For Joni, we reviewed that the information from genetic testing may determine:    additional cancer screening for which Joni may qualify (i.e. mammogram and breast MRI, more frequent colonoscopies, more frequent dermatologic exams, etc.),    options for risk reducing surgeries Joni could consider (i.e. bilateral mastectomy, surgery to remove her ovaries and/or uterus, etc.),      and targeted chemotherapies for Joni if she  were to develop certain cancers in the future (i.e. immunotherapy for individuals with Garcia syndrome, PARP inhibitors, etc.).     These recommendations and possible targeted chemotherapies will be discussed in detail once genetic testing is completed.    Joni expressed wanting to know what her insurance will cover before proceeding with genetic testing. I shared that I will submit a prior authorization through [employer] to her insurance. As soon as a determination of benefits is received, she will be contacted to discuss next steps.     Plan:  1) Today  Joni elected to proceed with submitting a prior authorization for BRCANext-Expanded genetic testing (23genes) through [employer].  2) This information should be available in approximately 2-4 weeks.  3) I will be notified by the laboratory regarding the prior authorization determination. I will contact  Joni with this information and to discuss next steps.     Raquel Rodriguez MS, [facility]  Licensed Genetic Counselor  [facility]  [phone]          Raquel Rodriguez, Genetic Counselor  2/15/2021  2:08 PM  Sign when Signing Visit  2/15/2021     Joni Garcia is a 20 y.o. female who is being evaluated via a billable video visit.      How would you like to obtain your AVS? MyChart.  If dropped from the video visit, the video invitation should be resent by: Send to e-mail at: [email or web address]  Will anyone else be joining your video visit? No      Video Start Time: 12:00pm    HPI   Review of Systems  Physical Exam    Video-Visit Details    Type of service:  Video Visit    Video End Time (time video stopped): 12:45pm  Originating Location (pt. Location): Home    Distant Location (provider location):  [facility]     Platform used for Video Visit: [facility] [employer]    Referring Provider: Lulu Hawk MD    Present for Today's Visit: Joni    Presenting Information:   I met with Joni Garcia today  for genetic counseling to discuss her family history of breast cancer.  She is here today to review this history, cancer screening recommendations, and available genetic testing options.    Personal History:  Joni is a 20 y.o. female. She does not have any personal history of cancer.       She had her first menstrual period at age 12, she does not currently have children, and is premenopausal.  Joni has her ovaries, fallopian tubes and uterus in place. She did have a pelvic US in 2017 due to pain that was normal.  She reports current history of oral contraceptive use (started about 1 week ago (past nexplanon use for 1 year) and that she has never been hormone replacement therapy.      She has never had a pap smear.  She has had one clinical breast exam at her recent physical and reports that she does not do regular self-exams. She has not had a colonoscopy.  She does not regularly do any other cancer screening at this time.  Joni reported no tobacco use, and rare/occasional alcohol use.    Family History: Joni's family history is significant for the following (Please see scanned pedigree for detailed family history information)  Siblings    Joni has one full-sister, age 11, who has sickle cell anemia.     Joni has one paternal half-sister, age 13, who is healthy.   Maternal    Joni's mother passed away at age from breast cancer diagnosed at age 43. She reports that her mother also had a history of sickle cell anemia.     No cancer history reported in two maternal aunts, two maternal uncles, either of her maternal grandparents, or any of her maternal first-cousins.   Paternal    Joni's father, age 46, is reportedly healthy with no cancer history.     No cancer history reported in multiple paternal aunts and uncles, either of her paternal grandparents, or any of her paternal first-cousins.        Her maternal ethnicity is [place]ij[place]. Her paternal ethnicity is [place].  There is no known Ashkenazi Latter-day  ancestry on either side of her family. There is no reported consanguinity.    Discussion:    Joni's family history of breast cancer is suggestive of a hereditary cancer syndrome.    We reviewed the features of sporadic, familial, and hereditary cancers. In looking at Joni's family history, it is possible that a cancer susceptibility gene is present due to her mother's early-onset breast cancer.    We discussed the natural history and genetics of hereditary breast cancers. A detailed handout regarding the information we discussed will be sent to Joni via [employer]. Topics included: inheritance pattern, cancer risks, cancer screening recommendations, and also risks, benefits and limitations of testing.    We reviewed that the most common cause of hereditary breast cancer is Hereditary Breast and Ovarian Cancer (HBOC) syndrome, which is caused by mutations in the genes BRCA1 and BRCA2.  BRCA1 and BRCA2 are two genes that increase the risk for breast and ovarian cancers, among others. Women who inherit a BRCA mutation have a 50 to 85% lifetime risk of breast cancer and up to 40-58% lifetime risk of ovarian cancer. This is higher than the general population lifetime risks of 12% for breast cancer and less than 2% for ovarian cancer. Men with BRCA gene mutations have up to a 7% risk of breast cancer and 20% risk of prostate cancer. Other cancers, such as pancreatic cancer and melanoma, have also been associated with BRCA mutations.    We discussed in detail the screening recommendations for those who have a mutation in the BRCA1/2 genes.     Based on her family history, Joni meets current National Comprehensive Cancer Network (NCCN) criteria for genetic testing of high-penetrance breast and/or ovarian cancer susceptibility genes.      We discussed that there are additional genes that could cause increased risk for breast cancer. As many of these genes present with overlapping features in a family and accurate cancer  risk cannot always be established based upon the pedigree analysis alone, it would be reasonable for Joni to consider panel genetic testing to analyze multiple genes at once.    We reviewed genetic testing options for hereditary breast and gynecologic cancers: actionable high/moderate breast and gynecologic cancer risk custom panel (CustomNext-Cancer, 19 genes, a combination of BRCANext +  STK11) and expanded high and moderate risk panel (BRCANext-Expanded, 23 genes). Joni expressed an interest in learning as much information as possible from the testing. She opted for the BRCANext-Expanded panel.  Genetic testing is available for 23 genes associated with hereditary gynecologic, breast, and related cancers: BRCANext-Expanded (SELIN, BARD1, BRCA1, BRCA2, BRIP1, CDH1, CHEK2, DICER1, EPCAM, MLH1, MSH2, MSH6, NBN, NF1, PALB2, PMS2, PTEN, RAD51C, RAD51D, RECQL, SMARCA4, STK11, TP53).  We discussed that some of the genes in the BRCANext-Expanded panel are associated with specific hereditary cancer syndromes and published management guidelines: Hereditary Breast and Ovarian Cancer syndrome (BRCA1, BRCA2), Garcia syndrome (MLH1, MSH2, MSH6, PMS2, EPCAM), Hereditary Diffuse Gastric Cancer (CDH1), Cowden syndrome (PTEN), Li Fraumeni syndrome (TP53), Peutz-Jeghers syndrome (STK11), and Neurofibromatosis type 1 (NF1).    Risk-reducing salpingo-oophorectomy can be considered in women with mutations in BRIP1, RAD51C, or RAD51D. Breast and/or other cancer risk management guidelines are available for SELIN, CHEK2, PALB2, NF1, and NBN.  The remaining genes (BARD1, DICER1, RECQL, and SMARCA4) are associated with increased cancer risk and may allow us to make medical recommendations when mutations are identified.      Consent was obtained over the phone with no witness required due to the current covid19 global pandemic.    Medical Management: For Joni, we reviewed that the information from genetic testing may determine:    additional  cancer screening for which Joni may qualify (i.e. mammogram and breast MRI, more frequent colonoscopies, more frequent dermatologic exams, etc.),    options for risk reducing surgeries Joni could consider (i.e. bilateral mastectomy, surgery to remove her ovaries and/or uterus, etc.),      and targeted chemotherapies for Joni if she were to develop certain cancers in the future (i.e. immunotherapy for individuals with Garcia syndrome, PARP inhibitors, etc.).     These recommendations and possible targeted chemotherapies will be discussed in detail once genetic testing is completed.    Joni expressed wanting to know what her insurance will cover before proceeding with genetic testing. I shared that I will submit a prior authorization through [employer] to her insurance. As soon as a determination of benefits is received, she will be contacted to discuss next steps.     Plan:  1) Today  Joni elected to proceed with submitting a prior authorization for BRCANext-Expanded genetic testing (23genes) through [employer].  2) This information should be available in approximately 2-4 weeks.  3) I will be notified by the laboratory regarding the prior authorization determination. I will contact  Joni with this information and to discuss next steps.     Raquel Rodriguez MS, [facility]  Licensed Genetic Counselor  [facility]  [phone]

## 2021-06-27 ENCOUNTER — HEALTH MAINTENANCE LETTER (OUTPATIENT)
Age: 21
End: 2021-06-27

## 2021-06-30 NOTE — PROGRESS NOTES
Progress Notes by Lulu Hawk MD at 2021 11:20 AM     Author: Lulu Hawk MD Service: -- Author Type: Physician    Filed: 2021  2:38 PM Encounter Date: 2021 Status: Signed    : Lulu Hawk MD (Physician)       FEMALE PREVENTATIVE EXAM    Assessment and Plan:       Joni was seen today for annual exam.    Routine general medical examination at a health care facility  -     Glucose    Mild major depression (H)  Doing much better currently in counseling which is helping  Screen for STD (sexually transmitted disease)  -     Chlamydia trachomatis & Neisseria gonorrhoeae, Amplified Detection    Family history of breast cancer in mother  -Mom with a history of metastatic breast cancer  last year we will have her see a genetic counselor to see how will we should start her screening for breast cancer       Ambulatory referral to Genetics    Encounter for counseling regarding contraception  Education provided on starting method also if you skip the pill what to do information is in my chart_patient education  -     norgestimate-ethinyl estradioL (SPRINTEC, 28,) 0.25-35 mg-mcg per tablet; Take 1 tablet by mouth daily.    H/O: iron deficiency anemia  -     Hemoglobin        Next follow up:  1 yr for annual physical    Immunization Reviewed and if needed ordered please see A/P    There are no preventive care reminders to display for this patient.    Immunization History   Administered Date(s) Administered   ? HPV 9 Valent 10/24/2016   ? HPV Quadrivalent 2013, 2015   ? Hep A, Adult IM (19yr & older) 2009, 2010   ? Hep A, historic 2009, 2010   ? Hep B, Adult 2008, 2008, 2009   ? Hep B, historic 2008, 2008, 2009   ? INFLUENZA,SEASONAL QUAD, PF, =/> 6months 10/09/2019   ? IPV 2008, 2008, 2009   ? Influenza, Seasonal, Inj PF IIV3 2010, 2011   ? Influenza, inj, historic,unspecified 2015, 10/24/2016    ? Influenza,seasonal,quad inj =/> 6months 09/24/2015, 10/24/2016   ? MMR 08/18/2008, 12/23/2008   ? Meningococcal MCV4P 01/28/2013, 04/11/2018   ? Td, Adult, Absorbed 11/19/2010, 09/26/2011   ? Td,adult,historic,unspecified 11/19/2010, 09/26/2011   ? Tdap 08/18/2008, 04/11/2018   ? Tetanus Toxoid, Adsorbed 11/19/2010   ? Varicella 08/18/2008, 12/23/2008       I discussed the following with the patient:   Adult Healthy Living: Importance of regular exercise  Healthy nutrition  Getting adequate sleep  Stress management  Supplement use  Herbal medications/alternative medical therapies    The following high BMI interventions were performed this visit: dietary management education, guidance, and counseling    I have had an Advance Directives discussion with the patient.    Subjective:   Chief Complaint: Joni Garcia is an 20 y.o. female here for a preventative health visit.     HPI: Patient very pleasant healthy 20-year-old with no major concern today just like to have her breast exam and screening for infection done patient had one-time sexual activity last year no STD screening during that time willing to do it today  Cycles are regular  Patient like to do birth control counseling we did the implant 2 years ago more for help with the bleeding that time she bled through 6 months straight we remove the implant and since then no birth control currently not sexually active but like to be on birth control in case all the different options discussed in detail with the patient patient like to just start with the birth control pills..    Patient's last menstrual period was 01/30/2021.     PHQ-9 Total Score: 3 (2/5/2021 11:00 AM)    Healthy Habits  Are you taking a daily aspirin? No  Do you typically exercising at least 40 min, 3-4 times per week?  Yes  Do you usually eat at least 4 servings of fruit and vegetables a day, include whole grains and fiber and avoid regularly eating high fat foods? yes  Have you had an eye exam  "in the past two years? yes  Do you see a dentist twice per year? yes  Do you have any concerns regarding sleep? No    Safety Screen  If you own firearms, are they secured in a locked gun cabinet or with trigger locks? The patient does not own any firearms    No data recorded    Review of Systems:  Please see above.  The rest of the review of systems are negative for all systems.     Pap History:   Yes - updated in Problem List and Health Maintenance accordingly    Cancer Screening     Patient has no health maintenance due at this time          Patient Care Team:  Lulu Hawk MD as PCP - General (Family Medicine)  Lulu Hawk MD as Assigned PCP        History     Reviewed By Date/Time Sections Reviewed    Xin Mcnamara, [facility] 2/5/2021 11:33 AM Tobacco            Objective:   Vital Signs:   Visit Vitals  BP 90/62 (Patient Site: Left Arm, Patient Position: Sitting, Cuff Size: Adult Regular)   Pulse 65   Temp 98.1  F (36.7  C) (Oral)   Ht 5' 3.5\" (1.613 m)   Wt 132 lb (59.9 kg)   LMP 01/30/2021   BMI 23.02 kg/m         PHYSICAL EXAM  Physical Exam:  General Appearance:  Appears comfortable, Alert, cooperative, no distress,   Head: Normocephalic, without obvious abnormality, atraumatic  Eyes: PERRL, conjunctiva/corneas clear, EOM's intact, both eyes             Nose: Nares normal, no drainage   Throat: Lips, mucosa, and tongue normal; teeth and gums normal  Neck: Supple, symmetrical, trachea midline, no adenopathy;                      Lungs: Clear to auscultation bilaterally, respirations unlabored  Pelvic exam: normal external genitalia, vulva, vagina, cervix, uterus and adnexa.  Heart: Regular rate and rhythm, S1 and S2 normal, no murmur, rubs or gallop  Abdomen: Soft, non-tender, bowel sounds active all four quadrants,   no masses, no organomegaly  Extremities: Extremities normal, atraumatic, no cyanosis or edema  Pulses: DP pulses are 1-2+ bilat.    Skin: no rashes or lesions  Neurologic: normal and equal " strength bilat in upper and lower extremities                 Medication List          Accurate as of February 5, 2021  2:35 PM. If you have any questions, ask your nurse or doctor.            START taking these medications    norgestimate-ethinyl estradioL 0.25-35 mg-mcg per tablet  Also known as: Sprintec (28)  INSTRUCTIONS: Take 1 tablet by mouth daily.  Started by: Lulu Hawk MD              Where to Get Your Medications      [id number] [address] [place]  [address] [facility] [address]    Phone: [phone]     norgestimate-ethinyl estradioL 0.25-35 mg-mcg per tablet         Additional Screenings Completed Today:

## 2021-07-03 NOTE — ADDENDUM NOTE
Addendum Note by Lulu Hawk MD at 4/11/2018  1:27 PM     Author: Lulu Hawk MD Service: -- Author Type: Physician    Filed: 4/11/2018  1:27 PM Encounter Date: 4/11/2018 Status: Signed    : Lulu Hawk MD (Physician)    Addended by: LULU HAWK on: 4/11/2018 01:27 PM        Modules accepted: Orders

## 2021-10-17 ENCOUNTER — HEALTH MAINTENANCE LETTER (OUTPATIENT)
Age: 21
End: 2021-10-17

## 2021-10-19 PROBLEM — F32.9 MAJOR DEPRESSION: Status: ACTIVE | Noted: 2021-02-05

## 2022-04-02 ENCOUNTER — HEALTH MAINTENANCE LETTER (OUTPATIENT)
Age: 22
End: 2022-04-02

## 2022-04-15 ENCOUNTER — MEDICAL CORRESPONDENCE (OUTPATIENT)
Dept: HEALTH INFORMATION MANAGEMENT | Facility: CLINIC | Age: 22
End: 2022-04-15
Payer: COMMERCIAL

## 2022-04-15 ENCOUNTER — TRANSFERRED RECORDS (OUTPATIENT)
Dept: HEALTH INFORMATION MANAGEMENT | Facility: CLINIC | Age: 22
End: 2022-04-15
Payer: COMMERCIAL

## 2022-04-15 LAB — HIV 1&2 EXT: NORMAL

## 2022-04-19 ENCOUNTER — TRANSCRIBE ORDERS (OUTPATIENT)
Dept: OTHER | Age: 22
End: 2022-04-19
Payer: COMMERCIAL

## 2022-04-19 DIAGNOSIS — Z80.3 FAMILY HISTORY OF MALIGNANT NEOPLASM OF BREAST: Primary | ICD-10-CM

## 2022-04-26 ASSESSMENT — PATIENT HEALTH QUESTIONNAIRE - PHQ9
10. IF YOU CHECKED OFF ANY PROBLEMS, HOW DIFFICULT HAVE THESE PROBLEMS MADE IT FOR YOU TO DO YOUR WORK, TAKE CARE OF THINGS AT HOME, OR GET ALONG WITH OTHER PEOPLE: SOMEWHAT DIFFICULT
SUM OF ALL RESPONSES TO PHQ QUESTIONS 1-9: 8
SUM OF ALL RESPONSES TO PHQ QUESTIONS 1-9: 8

## 2022-04-27 ENCOUNTER — OFFICE VISIT (OUTPATIENT)
Dept: FAMILY MEDICINE | Facility: CLINIC | Age: 22
End: 2022-04-27
Payer: COMMERCIAL

## 2022-04-27 VITALS
WEIGHT: 131.3 LBS | BODY MASS INDEX: 23.27 KG/M2 | HEIGHT: 63 IN | SYSTOLIC BLOOD PRESSURE: 110 MMHG | HEART RATE: 60 BPM | DIASTOLIC BLOOD PRESSURE: 74 MMHG

## 2022-04-27 DIAGNOSIS — N89.8 VAGINAL IRRITATION: ICD-10-CM

## 2022-04-27 DIAGNOSIS — D50.0 IRON DEFICIENCY ANEMIA DUE TO CHRONIC BLOOD LOSS: ICD-10-CM

## 2022-04-27 DIAGNOSIS — Z00.01 ENCOUNTER FOR ROUTINE ADULT MEDICAL EXAM WITH ABNORMAL FINDINGS: Primary | ICD-10-CM

## 2022-04-27 DIAGNOSIS — N92.0 POLYMENORRHEA: ICD-10-CM

## 2022-04-27 DIAGNOSIS — F33.1 MODERATE RECURRENT MAJOR DEPRESSION (H): ICD-10-CM

## 2022-04-27 DIAGNOSIS — E55.9 VITAMIN D DEFICIENCY: ICD-10-CM

## 2022-04-27 DIAGNOSIS — Z13.228 SCREENING FOR METABOLIC DISORDER: ICD-10-CM

## 2022-04-27 DIAGNOSIS — Z12.4 CERVICAL CANCER SCREENING: ICD-10-CM

## 2022-04-27 DIAGNOSIS — Z80.3 FAMILY HISTORY OF MALIGNANT NEOPLASM OF BREAST: ICD-10-CM

## 2022-04-27 PROBLEM — F33.0 MILD EPISODE OF RECURRENT MAJOR DEPRESSIVE DISORDER (H): Status: ACTIVE | Noted: 2021-02-05

## 2022-04-27 LAB
CLUE CELLS: ABNORMAL
TRICHOMONAS, WET PREP: ABNORMAL
WBC'S/HIGH POWER FIELD, WET PREP: ABNORMAL
YEAST, WET PREP: ABNORMAL

## 2022-04-27 PROCEDURE — 99214 OFFICE O/P EST MOD 30 MIN: CPT | Mod: 25 | Performed by: FAMILY MEDICINE

## 2022-04-27 PROCEDURE — G0123 SCREEN CERV/VAG THIN LAYER: HCPCS | Performed by: FAMILY MEDICINE

## 2022-04-27 PROCEDURE — G0124 SCREEN C/V THIN LAYER BY MD: HCPCS | Performed by: PATHOLOGY

## 2022-04-27 PROCEDURE — 87210 SMEAR WET MOUNT SALINE/INK: CPT | Performed by: FAMILY MEDICINE

## 2022-04-27 PROCEDURE — 99395 PREV VISIT EST AGE 18-39: CPT | Performed by: FAMILY MEDICINE

## 2022-04-27 RX ORDER — SPIRONOLACTONE 50 MG/1
TABLET, FILM COATED ORAL
COMMUNITY
Start: 2021-11-24 | End: 2022-04-27

## 2022-04-27 RX ORDER — RNA INGREDIENT BNT-162B2 0.23 G/1.8ML
INJECTION, SUSPENSION INTRAMUSCULAR
COMMUNITY
Start: 2021-09-30 | End: 2022-04-27

## 2022-04-27 RX ORDER — FLUOCINOLONE ACETONIDE 0.11 MG/ML
OIL TOPICAL
COMMUNITY
Start: 2021-08-27 | End: 2022-04-27

## 2022-04-27 RX ORDER — CHOLECALCIFEROL (VITAMIN D3) 50 MCG
TABLET ORAL
COMMUNITY
Start: 2021-09-09 | End: 2022-04-27

## 2022-04-27 RX ORDER — BETAMETHASONE DIPROPIONATE 0.5 MG/G
LOTION TOPICAL
COMMUNITY
Start: 2021-10-13 | End: 2022-04-27

## 2022-04-27 RX ORDER — TACROLIMUS 0.1 %
OINTMENT (GRAM) TOPICAL
COMMUNITY
Start: 2021-11-27 | End: 2022-04-27

## 2022-04-27 ASSESSMENT — ANXIETY QUESTIONNAIRES
IF YOU CHECKED OFF ANY PROBLEMS ON THIS QUESTIONNAIRE, HOW DIFFICULT HAVE THESE PROBLEMS MADE IT FOR YOU TO DO YOUR WORK, TAKE CARE OF THINGS AT HOME, OR GET ALONG WITH OTHER PEOPLE: SOMEWHAT DIFFICULT
5. BEING SO RESTLESS THAT IT IS HARD TO SIT STILL: NOT AT ALL
2. NOT BEING ABLE TO STOP OR CONTROL WORRYING: MORE THAN HALF THE DAYS
4. TROUBLE RELAXING: SEVERAL DAYS
1. FEELING NERVOUS, ANXIOUS, OR ON EDGE: SEVERAL DAYS
3. WORRYING TOO MUCH ABOUT DIFFERENT THINGS: SEVERAL DAYS
GAD7 TOTAL SCORE: 6
6. BECOMING EASILY ANNOYED OR IRRITABLE: NOT AT ALL
7. FEELING AFRAID AS IF SOMETHING AWFUL MIGHT HAPPEN: SEVERAL DAYS

## 2022-04-27 NOTE — LETTER
My Depression Action Plan  Name: Joni Garcia   Date of Birth 2000  Date: 4/27/2022    My doctor: Lulu Hawk   My clinic: [address] [address] [phone]          GREEN    ZONE   Good Control    What it looks like:     Things are going generally well. You have normal ups and downs. You may even feel depressed from time to time, but bad moods usually last less than a day.   What you need to do:  1. Continue to care for yourself (see self care plan)  2. Check your depression survival kit and update it as needed  3. Follow your physician s recommendations including any medication.  4. Do not stop taking medication unless you consult with your physician first.           YELLOW         ZONE Getting Worse    What it looks like:     Depression is starting to interfere with your life.     It may be hard to get out of bed; you may be starting to isolate yourself from others.    Symptoms of depression are starting to last most all day and this has happened for several days.     You may have suicidal thoughts but they are not constant.   What you need to do:     1. Call your care team. Your response to treatment will improve if you keep your care team informed of your progress. Yellow periods are signs an adjustment may need to be made.     2. Continue your self-care.  Just get dressed and ready for the day.  Don't give yourself time to talk yourself out of it.    3. Talk to someone in your support network.    4. Open up your Depression Self-Care Plan/Wellness Kit.           RED    ZONE Medical Alert - Get Help    What it looks like:     Depression is seriously interfering with your life.     You may experience these or other symptoms: You can t get out of bed most days, can t work or engage in other necessary activities, you have trouble taking care of basic hygiene, or basic responsibilities, thoughts of suicide or death that will not  go away, self-injurious behavior.     What you need to do:  1. Call your care team and request a same-day appointment. If they are not available (weekends or after hours) call your local crisis line, emergency room or 911.          Depression Self-Care Plan / Wellness Kit    Many people find that medication and therapy are helpful treatments for managing depression. In addition, making small changes to your everyday life can help to boost your mood and improve your wellbeing. Below are some tips for you to consider. Be sure to talk with your medical provider and/or behavioral health consultant if your symptoms are worsening or not improving.     Sleep   Sleep hygiene  means all of the habits that support good, restful sleep. It includes maintaining a consistent bedtime and wake time, using your bedroom only for sleeping or sex, and keeping the bedroom dark and free of distractions like a computer, smartphone, or television.     Develop a Healthy Routine  Maintain good hygiene. Get out of bed in the morning, make your bed, brush your teeth, take a shower, and get dressed. Don t spend too much time viewing media that makes you feel stressed. Find time to relax each day.    Exercise  Get some form of exercise every day. This will help reduce pain and release endorphins, the  feel good  chemicals in your brain. It can be as simple as just going for a walk or doing some gardening, anything that will get you moving.      Diet  Strive to eat healthy foods, including fruits and vegetables. Drink plenty of water. Avoid excessive sugar, caffeine, alcohol, and other mood-altering substances.     Stay Connected with Others  Stay in touch with friends and family members.    Manage Your Mood  Try deep breathing, massage therapy, biofeedback, or meditation. Take part in fun activities when you can. Try to find something to smile about each day.     Psychotherapy  Be open to working with a therapist if your provider recommends it.      Medication  Be sure to take your medication as prescribed. Most anti-depressants need to be taken every day. It usually takes several weeks for medications to work. Not all medicines work for all people. It is important to follow-up with your provider to make sure you have a treatment plan that is working for you. Do not stop your medication abruptly without first discussing it with your provider.    Crisis Resources   These hotlines are for both adults and children. They and are open 24 hours a day, 7 days a week unless noted otherwise.      National Suicide Prevention Lifeline   9-105-797-TALK (0391)      Crisis Text Line    www.crisistextline.org  Text HOME to 688410 from anywhere in the [place], anytime, about any type of crisis. A live, trained crisis counselor will receive the text and respond quickly.      [facility] Lifeline for LGBTQ Youth  A national crisis intervention and suicide lifeline for LGBTQ youth under 25. Provides a safe place to talk without judgement. Call 1-769.548.5761; text START to 791118 or visit www.thetrevorproject.org to talk to a trained counselor.      For [facility] crisis numbers, visit the [facility] website at:  https://[place].gov/dhs/people-we-serve/adults/health-care/mental-health/resources/crisis-contacts.jsp

## 2022-04-27 NOTE — PATIENT INSTRUCTIONS
Preventive Health Recommendations  Female Ages 21 to 25     Yearly exam:   See your health care provider every year in order to  Review health changes.   Discuss preventive care.    Review your medicines if your doctor has prescribed any.    You should be tested each year for STDs (sexually transmitted diseases).     Talk to your provider about how often you should have cholesterol testing.    Get a Pap test every three years. If you have an abnormal result, your doctor may have you test more often.    If you are at risk for diabetes, you should have a diabetes test (fasting glucose).     Shots:   Get a flu shot each year.   Get a tetanus shot every 10 years.   Consider getting the shot (vaccine) that prevents cervical cancer (Gardasil).    Nutrition:   Eat at least 5 servings of fruits and vegetables each day.  Eat whole-grain bread, whole-wheat pasta and brown rice instead of white grains and rice.  Get adequate Calcium and Vitamin D.     Lifestyle  Exercise at least 150 minutes a week each week (30 minutes a day, 5 days a week). This will help you control your weight and prevent disease.  Limit alcohol to one drink per day.  No smoking.   Wear sunscreen to prevent skin cancer.  See your dentist every six months for an exam and cleaning.  Recovering from depression takes time. If you are on medications keep taking your medication, let your doctor know if you are experiencing any side effects.  Use your  Depression Survival Kit . Follow-up with therapy and/or your doctor as recommended. If you feel you might ever harm yourself or another person, you should go to the emergency room or call 911 immediately    Coping with Depression  Coping with Depression in Children and Teens      [facility]

## 2022-04-27 NOTE — PROGRESS NOTES
"   SUBJECTIVE:   CC: Joni Garcia 22 year old   who presents for preventive health visit.       Patient has been advised of split billing requirements and indicates understanding: Yes    I spent 20  minutes with the patient total  from the the prevent visit, >50% of which was in counseling regarding the patient's medical issues as noted above.      Healthy Habits:     Getting at least 3 servings of Calcium per day:  Yes    Bi-annual eye exam:  NO    Dental care twice a year:  Yes    Sleep apnea or symptoms of sleep apnea:  Daytime drowsiness    Diet:  Regular (no restrictions)    Frequency of exercise:  2-3 days/week    Duration of exercise:  Greater than 60 minutes    Taking medications regularly:  No    Barriers to taking medications:  None    Medication side effects:  None    PHQ-2 Total Score: 4    Additional concerns today:  No   Answers for HPI/ROS submitted by the patient on 4/26/2022  If you checked off any problems, how difficult have these problems made it for you to do your work, take care of things at home, or get along with other people?: Somewhat difficult  PHQ9 TOTAL SCORE: 8    Patient described her diet as \"pretty good,\" eating fruits and veggies and limiting fried and junk food and sugary drinks. Just started exercising for one hour 1-2 times a week. She does cardio and weights. Denies recreational drug, alcohol, or tobacco use. Sexually active with one male partner. Uses condoms sometimes but usually relies on \"pullout.\" Not doing anything else to prevent pregnancy. Having regular periods, both interval (5 days) and duration (30ish days). History of anemia from menorrhagia. Has tried different contraception methods in the past (implant and pill) which gave her side effects such as heavier bleeding (arm implant) and depression(pill).    Her mother was diagnosed with breast cancer (type unknown) at 43 and passed at 45. She had been to a genetic counselor but was lost to follow-up. " Information about hereditary causes of breast cancer was given to her in clinic.     Depression: Have been seeing a therapist for two years for this. Never tried any medications for this. Would like to learn more about medical treatment before she starts taking anything. Denies SI/HI. Her mother passed away at 45 from breast cancer two years ago. This is the main cause of depression. She has two younger sisters and lives with her roommate on campus ([facility] at [employer]).     Anxiety: Have been seeing a therapist for two years for this. Never tried any medications for this. Would like to learn more about medical treatment before she starts taking anything. School is the main source of anxiety, especially not knowing where she is headed. She is majoring in psychology, but not sure what she will do after graduation.    No additional concerns or questions        PHQ 2/5/2021 4/23/2021 4/26/2022   PHQ-9 Total Score 3 8 8   Q9: Thoughts of better off dead/self-harm past 2 weeks Not at all Not at all Not at all        Today's PHQ-2 Score:   PHQ-2 ( 1999 Pfizer) 4/26/2022   Q1: Little interest or pleasure in doing things 2   Q2: Feeling down, depressed or hopeless 2   PHQ-2 Score 4   Q1: Little interest or pleasure in doing things More than half the days   Q2: Feeling down, depressed or hopeless More than half the days   PHQ-2 Score 4       Abuse: Current or Past (Physical, Sexual or Emotional) - No  Do you feel safe in your environment? Yes    Have you ever done Advance Care Planning? (For example, a Health Directive, POLST, or a discussion with a medical provider or your loved ones about your wishes): No, advance care planning information given to patient to review.  Patient declined advance care planning discussion at this time.    Social History     Tobacco Use     Smoking status: Never Smoker     Smokeless tobacco: Never Used   Substance Use Topics     Alcohol use: No           ASSESSMENT/PLAN:   Joni was seen today for  physical.    Diagnoses and all orders for this visit:    Encounter for routine adult medical exam with abnormal findings  Comments:  Family history at breast cancer at 43 (mom). Depression and anxiety. PAP done in clinic. COVID booster tomorrow.  Orders:  -     REVIEW OF HEALTH MAINTENANCE PROTOCOL ORDERS  -     INFLUENZA VACCINE IM > 6 MONTHS VALENT IIV4 (AFLURIA/FLUZONE); Future  -     COVID-19,PF,PFIZER (12+ YRS); Future    Cervical cancer screening  Comments:  First PAP. Done in clinic today. Sexually active with one male partner with inconsistent condom use. Will follow  Orders:  -     Pap Screen only - recommended age 21 - 24 years; Future  -     Pap Screen only - recommended age 21 - 24 years    Iron deficiency anemia due to chronic blood loss  Comments:  History of anemia due to menorrhagia induced by a birth control implant in the past. This has resolved but will closely monitor.    Polymenorrhea  Comments:  History of menorrhagia induced by a birth control implant in the past. This has resolved but will closely monitor.    Screening for metabolic disorder  Comments:  Fatigue and depression; will check labs including thyroid funtion.  Orders:  -     TSH with free T4 reflex; Future  -     Lipid Profile (Chol, Trig, HDL, LDL calc); Future  -     Hemoglobin A1c; Future    Vitamin D deficiency  Comments:  continue supplementation.  Orders:  -     25- [place]-Vitamin D; Future    Moderate recurrent major depression (H)  Comments:  Worsening of depression is concerning. Has been seeing a therapist for the last 2 years. Agrees to try 50mg Zoloft. Will monitor progression.  Orders:  -     sertraline (ZOLOFT) 50 MG tablet; Take 1 tablet (50 mg) by mouth daily  -     Mental Health Referral - Therapy    Family history of malignant neoplasm of breast  Comments:  Her mom passed away at 45 due to breast cancer (diagnosed at 43). Patient has been seen by a genetic counselor but was lost to f/u. Information given.    Vaginal  "irritation  -     Wet prep - Clinic Collect        Patient has been advised of split billing requirements and indicates understanding: Yes    COUNSELING:  Reviewed preventive health counseling, as reflected in patient instructions       Regular exercise       Healthy diet/nutrition       Vision screening       Hearing screening       Contraception       Safe sex practices/STD prevention       Advance Care Planning    Estimated body mass index is 23.26 kg/m  as calculated from the following:    Height as of this encounter: 1.6 m (5' 3\").    Weight as of this encounter: 59.6 kg (131 lb 4.8 oz).        She reports that she has never smoked. She has never used smokeless tobacco.      Reviewed orders with patient.  Reviewed health maintenance and updated orders accordingly - Yes  Lab work is in process  Labs reviewed in EPIC  BP Readings from Last 3 Encounters:   04/27/22 110/74   04/23/21 108/64   02/05/21 90/62    Wt Readings from Last 3 Encounters:   04/27/22 59.6 kg (131 lb 4.8 oz)   04/23/21 60.6 kg (133 lb 8 oz)   02/05/21 59.9 kg (132 lb) (57 %, Z= 0.17)*     * Growth percentiles are based on CDC (Girls, 2-20 Years) data.                 Patient Active Problem List   Diagnosis     Polymenorrhea     Anemia     Mild episode of recurrent major depressive disorder (H)     History reviewed. No pertinent surgical history.    Social History     Tobacco Use     Smoking status: Never Smoker     Smokeless tobacco: Never Used   Substance Use Topics     Alcohol use: No     History reviewed. No pertinent family history.        Current Outpatient Medications   Medication Sig Dispense Refill     sertraline (ZOLOFT) 50 MG tablet Take 1 tablet (50 mg) by mouth daily 30 tablet 1     No Known Allergies    Recent Labs   Lab Test 04/29/19  0833   TSH 1.33        Breast Cancer Screening:  Any new diagnosis of family breast, ovarian, or bowel cancer? Yes     Pertinent mammograms are reviewed under the imaging tab.    History of abnormal " "Pap smear: NO - age 21-29 PAP every 3 years recommended     Reviewed and updated as needed this visit by clinical staff   Tobacco  Allergies  Meds  Problems  Med Hx  Surg Hx  Fam Hx            Reviewed and updated as needed this visit by Provider   Tobacco  Allergies  Meds  Problems  Med Hx  Surg Hx  Fam Hx           History reviewed. No pertinent past medical history.   History reviewed. No pertinent surgical history.    Review of Systems  Skin: negative, rash, bruising  Eyes: negative, visual blurring, double vision  Ears/Nose/Throat: negative, tinnitus, vertigo  Respiratory: No shortness of breath, dyspnea on exertion, cough, or hemoptysis  Cardiovascular: negative, exertional chest pain or pressure, dyspnea on exertion, orthopnea and lower extremity edema  Gastrointestinal: negative, nausea, vomiting, hematochezia, constipation and diarrhea  Genitourinary: negative, dysuria, frequency, urgency, hematuria, vaginal discharge and abnormal menstrual cycles  Musculoskeletal: negative, joint pain, joint swelling and joint stiffness  Neurologic: negative, numbness or tingling of hands and numbness or tingling of feet  Psychiatric: negative, thoughts of self-harm and thoughts of hurting someone else  Hematologic/Lymphatic/Immunologic: negative, chills and fever  Endocrine: negative, cold intolerance and heat intolerance     OBJECTIVE:   /74   Pulse 60   Ht 1.6 m (5' 3\")   Wt 59.6 kg (131 lb 4.8 oz)   LMP 04/06/2022   BMI 23.26 kg/m    Physical Exam  GENERAL: healthy, alert and no distress  EYES: Eyes grossly normal to inspection, PERRL and conjunctivae and sclerae normal  HENT: ear canals and TM's normal, nose and mouth without ulcers or lesions  NECK: no adenopathy, no asymmetry, masses, or scars and thyroid normal to palpation  RESP: lungs clear to auscultation - no rales, rhonchi or wheezes  BREAST: normal without masses, tenderness or nipple discharge and no palpable axillary masses or " adenopathy  CV: regular rate and rhythm, normal S1 S2, no S3 or S4, no murmur, click or rub, no peripheral edema and peripheral pulses strong  ABDOMEN: soft, nontender, no hepatosplenomegaly, no masses and bowel sounds normal   (female): normal female external genitalia, normal urethral meatus , vaginal mucosa pink, moist, well rugated and normal cervix, adnexae, and uterus without masses.  MS: no gross musculoskeletal defects noted, no edema  SKIN: no suspicious lesions or rashes  PSYCH: mentation appears normal, affect normal/bright    Diagnostic Test Results:  Labs reviewed in [facility]      Counseling Resources:  ATP IV Guidelines  Pooled Cohorts Equation Calculator  Breast Cancer Risk Calculator  BRCA-Related Cancer Risk Assessment: FHS-7 Tool  FRAX Risk Assessment  ICSI Preventive Guidelines  Dietary Guidelines for Americans, 2010  USDA's MyPlate  ASA Prophylaxis  Lung CA Screening    Lulu Hawk MD  [facility]

## 2022-05-02 LAB
BKR LAB AP GYN ADEQUACY: ABNORMAL
BKR LAB AP GYN INTERPRETATION: ABNORMAL
BKR LAB AP HPV REFLEX: NO
BKR LAB AP PREVIOUS ABNORMAL: ABNORMAL
PATH REPORT.COMMENTS IMP SPEC: ABNORMAL
PATH REPORT.COMMENTS IMP SPEC: ABNORMAL
PATH REPORT.RELEVANT HX SPEC: ABNORMAL

## 2022-05-04 ENCOUNTER — PATIENT OUTREACH (OUTPATIENT)
Dept: FAMILY MEDICINE | Facility: CLINIC | Age: 22
End: 2022-05-04
Payer: COMMERCIAL

## 2022-05-05 ASSESSMENT — ANXIETY QUESTIONNAIRES: GAD7 TOTAL SCORE: 6

## 2022-07-18 DIAGNOSIS — F33.1 MODERATE RECURRENT MAJOR DEPRESSION (H): ICD-10-CM

## 2022-07-18 NOTE — TELEPHONE ENCOUNTER
"Routing refill request to provider for review/approval because:  PHQ9 score - greater than 4.    Last Written Prescription Date:  4/27/22  Last Fill Quantity: 30,  # refills: 1   Last office visit provider:  4/27/22     Requested Prescriptions   Pending Prescriptions Disp Refills     sertraline (ZOLOFT) 50 MG tablet [Pharmacy Med Name: SERTRALINE 50MG TABLETS] 30 tablet 1     Sig: TAKE 1/2 TABLET BY MOUTH DAILY FOR 2 WEEKS, THEN TAKE 1 TABLET(50 MG) BY MOUTH DAILY       SSRIs Protocol Failed - 7/18/2022  1:43 PM        Failed - PHQ-9 score less than 5 in past 6 months     Please review last PHQ-9 score.           Passed - Medication is active on med list        Passed - Patient is age 18 or older        Passed - No active pregnancy on record        Passed - No positive pregnancy test in last 12 months        Passed - Recent (6 mo) or future (30 days) visit within the authorizing provider's specialty     Patient had office visit in the last 6 months or has a visit in the next 30 days with authorizing provider or within the authorizing provider's specialty.  See \"Patient Info\" tab in inbasket, or \"Choose Columns\" in Meds & Orders section of the refill encounter.                 Randal Peguero RN 07/18/22 1:43 PM  "

## 2022-10-01 ENCOUNTER — HEALTH MAINTENANCE LETTER (OUTPATIENT)
Age: 22
End: 2022-10-01

## 2022-12-01 ENCOUNTER — TRANSFERRED RECORDS (OUTPATIENT)
Dept: HEALTH INFORMATION MANAGEMENT | Facility: CLINIC | Age: 22
End: 2022-12-01

## 2023-01-11 DIAGNOSIS — F33.1 MODERATE RECURRENT MAJOR DEPRESSION (H): ICD-10-CM

## 2023-01-12 NOTE — TELEPHONE ENCOUNTER
"Routing refill request to provider for review/approval because:  phq 9    Last Written Prescription Date:  7/18/22  Last Fill Quantity: 90,  # refills: 1   Last office visit provider:  4/27/22     Requested Prescriptions   Pending Prescriptions Disp Refills     sertraline (ZOLOFT) 50 MG tablet [Pharmacy Med Name: SERTRALINE 50MG TABLETS] 90 tablet 1     Sig: TAKE 1/2 TABLET BY MOUTH DAILY FOR 2 WEEKS THEN TAKE 1 TABLET(50 MG) BY MOUTH DAILY       SSRIs Protocol Failed - 1/11/2023 11:49 AM        Failed - PHQ-9 score less than 5 in past 6 months     Please review last PHQ-9 score.           Failed - Recent (6 mo) or future (30 days) visit within the authorizing provider's specialty     Patient had office visit in the last 6 months or has a visit in the next 30 days with authorizing provider or within the authorizing provider's specialty.  See \"Patient Info\" tab in inbasket, or \"Choose Columns\" in Meds & Orders section of the refill encounter.            Passed - Medication is active on med list        Passed - Patient is age 18 or older        Passed - No active pregnancy on record        Passed - No positive pregnancy test in last 12 months             Gladis Zapata, RN 01/12/23 12:20 PM  "

## 2023-04-12 ENCOUNTER — PATIENT OUTREACH (OUTPATIENT)
Dept: FAMILY MEDICINE | Facility: CLINIC | Age: 23
End: 2023-04-12
Payer: COMMERCIAL

## 2023-04-12 PROBLEM — R87.612 PAPANICOLAOU SMEAR OF CERVIX WITH LOW GRADE SQUAMOUS INTRAEPITHELIAL LESION (LGSIL): Status: ACTIVE | Noted: 2022-05-04

## 2023-05-08 NOTE — TELEPHONE ENCOUNTER
Patient due for Pap.    Reminder done today via telephone call-spoke to the pt and gave her the phone number to schedule.

## 2023-06-01 NOTE — TELEPHONE ENCOUNTER
Sagar Hawk,   Patient is lost to pap tracking follow-up. Attempts to contact pt have been made per reminder process and there has been no reply and/or no appt scheduled.  If you are wanting any additional contact attempts please send to your care team staff.     Pap Hx:  04/27/22 LSIL Pap. Plan pap in 1 year due 04/27/23.  Results and recommendations released to the pt through [facility], not viewed.  05/09/22 Pt was advised.  04/12/23 Reminder Kangsheng Chuangxiangt  05/8/23 Reminder call-spoke to the pt and gave her the phone number to schedule   06/1/23 Lost to follow-up for pap tracking, kevon routed to provider

## 2023-06-04 ENCOUNTER — HEALTH MAINTENANCE LETTER (OUTPATIENT)
Age: 23
End: 2023-06-04

## 2023-06-23 ENCOUNTER — HOSPITAL ENCOUNTER (OUTPATIENT)
Dept: GENERAL RADIOLOGY | Facility: CLINIC | Age: 23
Discharge: HOME | End: 2023-06-23
Attending: INTERNAL MEDICINE

## 2023-06-23 DIAGNOSIS — R76.12 POSITIVE QUANTIFERON-TB GOLD TEST: ICD-10-CM

## 2023-06-23 PROCEDURE — 999N000028 XR CHEST 1 VIEW, EMPLOYEE HEALTH

## 2023-06-23 PROCEDURE — 99207 XR CHEST 1 VIEW, EMPLOYEE HEALTH: CPT | Mod: GC | Performed by: RADIOLOGY

## 2023-09-18 ENCOUNTER — OFFICE VISIT (OUTPATIENT)
Dept: FAMILY MEDICINE | Facility: CLINIC | Age: 23
End: 2023-09-18
Payer: COMMERCIAL

## 2023-09-18 VITALS
DIASTOLIC BLOOD PRESSURE: 61 MMHG | OXYGEN SATURATION: 99 % | BODY MASS INDEX: 25.69 KG/M2 | HEIGHT: 63 IN | SYSTOLIC BLOOD PRESSURE: 110 MMHG | HEART RATE: 72 BPM | RESPIRATION RATE: 16 BRPM | TEMPERATURE: 98.5 F | WEIGHT: 145 LBS

## 2023-09-18 DIAGNOSIS — R07.89 INTERMITTENT LEFT-SIDED CHEST PAIN: ICD-10-CM

## 2023-09-18 DIAGNOSIS — R76.12 POSITIVE QUANTIFERON-TB GOLD TEST: Primary | ICD-10-CM

## 2023-09-18 LAB
ALBUMIN SERPL BCG-MCNC: 4.2 G/DL (ref 3.5–5.2)
ALP SERPL-CCNC: 50 U/L (ref 35–104)
ALT SERPL W P-5'-P-CCNC: 27 U/L (ref 0–50)
ANION GAP SERPL CALCULATED.3IONS-SCNC: 9 MMOL/L (ref 7–15)
AST SERPL W P-5'-P-CCNC: 28 U/L (ref 0–45)
BILIRUB SERPL-MCNC: 0.3 MG/DL
BUN SERPL-MCNC: 9.2 MG/DL (ref 6–20)
CALCIUM SERPL-MCNC: 9.6 MG/DL (ref 8.6–10)
CHLORIDE SERPL-SCNC: 104 MMOL/L (ref 98–107)
CREAT SERPL-MCNC: 0.71 MG/DL (ref 0.51–0.95)
DEPRECATED HCO3 PLAS-SCNC: 26 MMOL/L (ref 22–29)
EGFRCR SERPLBLD CKD-EPI 2021: >90 ML/MIN/1.73M2
ERYTHROCYTE [DISTWIDTH] IN BLOOD BY AUTOMATED COUNT: 11.7 % (ref 10–15)
GLUCOSE SERPL-MCNC: 93 MG/DL (ref 70–99)
HCT VFR BLD AUTO: 39.3 % (ref 35–47)
HGB BLD-MCNC: 13.2 G/DL (ref 11.7–15.7)
MCH RBC QN AUTO: 28.8 PG (ref 26.5–33)
MCHC RBC AUTO-ENTMCNC: 33.6 G/DL (ref 31.5–36.5)
MCV RBC AUTO: 86 FL (ref 78–100)
PLATELET # BLD AUTO: 275 10E3/UL (ref 150–450)
POTASSIUM SERPL-SCNC: 4.1 MMOL/L (ref 3.4–5.3)
PROT SERPL-MCNC: 7.8 G/DL (ref 6.4–8.3)
RBC # BLD AUTO: 4.58 10E6/UL (ref 3.8–5.2)
SODIUM SERPL-SCNC: 139 MMOL/L (ref 136–145)
WBC # BLD AUTO: 3.9 10E3/UL (ref 4–11)

## 2023-09-18 PROCEDURE — 90686 IIV4 VACC NO PRSV 0.5 ML IM: CPT

## 2023-09-18 PROCEDURE — 80053 COMPREHEN METABOLIC PANEL: CPT

## 2023-09-18 PROCEDURE — 99213 OFFICE O/P EST LOW 20 MIN: CPT | Mod: 25

## 2023-09-18 PROCEDURE — 90471 IMMUNIZATION ADMIN: CPT

## 2023-09-18 PROCEDURE — 85027 COMPLETE CBC AUTOMATED: CPT

## 2023-09-18 PROCEDURE — 36415 COLL VENOUS BLD VENIPUNCTURE: CPT

## 2023-09-18 RX ORDER — MINOXIDIL 2.5 MG/1
2.5 TABLET ORAL DAILY
COMMUNITY
Start: 2023-08-21

## 2023-09-18 RX ORDER — RIFAMPIN 300 MG/1
600 CAPSULE ORAL DAILY
Qty: 180 CAPSULE | Refills: 0 | Status: SHIPPED | OUTPATIENT
Start: 2023-09-18 | End: 2023-10-24

## 2023-09-18 RX ORDER — ISONIAZID 300 MG/1
300 TABLET ORAL DAILY
Qty: 90 TABLET | Refills: 0 | Status: SHIPPED | OUTPATIENT
Start: 2023-09-18 | End: 2023-10-24

## 2023-09-18 ASSESSMENT — PATIENT HEALTH QUESTIONNAIRE - PHQ9
SUM OF ALL RESPONSES TO PHQ QUESTIONS 1-9: 3
SUM OF ALL RESPONSES TO PHQ QUESTIONS 1-9: 3
10. IF YOU CHECKED OFF ANY PROBLEMS, HOW DIFFICULT HAVE THESE PROBLEMS MADE IT FOR YOU TO DO YOUR WORK, TAKE CARE OF THINGS AT HOME, OR GET ALONG WITH OTHER PEOPLE: NOT DIFFICULT AT ALL

## 2023-09-18 NOTE — PROGRESS NOTES
Assessment & Plan     1. Positive QuantiFERON-TB Gold test  Works in hospital setting. No signs/symptoms of active TB infection. Chest xray normal. Will treat for latent TB. Check CMP and CBC today.   - isoniazid (NYDRAZID) 300 MG tablet; Take 1 tablet (300 mg) by mouth daily for 90 days  Dispense: 90 tablet; Refill: 0  - rifampin (RIFADIN) 300 MG capsule; Take 2 capsules (600 mg) by mouth daily  Dispense: 180 capsule; Refill: 0  - Comprehensive metabolic panel (BMP + Alb, Alk Phos, ALT, AST, Total. Bili, TP)  - CBC with platelets    2. Intermittent left-sided chest pain  Resolves spontaneously. No respiratory or cardiac associated symptoms. Possible musculoskeletal etiology vs pleuritic pain. Discussed use of NSAIDS.   - Comprehensive metabolic panel (BMP + Alb, Alk Phos, ALT, AST, Total. Bili, TP)  - CBC with platelets      LUCIO Solis CNP  [facility]    Socorro Quinones is a 23 year old, presenting for the following health issues:  Office visit (I have been experiencing pain in my chest for a couple of months now. I aaa diagnosed with pneumonia in my right lunch back in late April. I also took a TB test for my job back in June and it tested positive meaning I could have been exposed to TB. I would like to come in to discuss my chest pains.) and Recheck Medication      9/18/2023    11:18 AM   Additional Questions   Roomed by jennifer thompson   Accompanied by alone         9/18/2023    11:20 AM   Patient Reported Additional Medications   Patient reports taking the following new medications minoxidil been taking for 3 months       History of Present Illness       Reason for visit:  Chest pain/ positive tuberculosis test  Symptom onset:  More than a month  Symptoms include:  Sharp pain in lower left lung area  Symptom intensity:  Moderate  Symptom progression:  Staying the same  Had these symptoms before:  No    She eats 0-1 servings of fruits and vegetables daily.She consumes 2  "sweetened beverage(s) daily.She exercises with enough effort to increase her heart rate 60 or more minutes per day.  She exercises with enough effort to increase her heart rate 5 days per week. She is missing 1 dose(s) of medications per week.     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 Chest xray was negative. She denies fever, chills, night sweats, cough, myalgias.     Also complaining of intermittent left lower anterior chest pain. This will occur randomly and resolves after a few minutes. Worsens with a deep breath. No nausea, abdominal pain.        Review of Systems   CONSTITUTIONAL: NEGATIVE for fever, chills, change in weight  RESP: NEGATIVE for significant cough or SOB  CV: NEGATIVE for chest pain, palpitations or peripheral edema  MUSCULOSKELETAL: NEGATIVE for significant arthralgias or myalgia      Objective    /61 (BP Location: Right arm, Patient Position: Sitting, Cuff Size: Adult Regular)   Pulse 72   Temp 98.5  F (36.9  C) (Oral)   Resp 16   Ht 1.6 m (5' 3\")   Wt 65.8 kg (145 lb)   LMP 09/14/2023   SpO2 99%   BMI 25.69 kg/m    Body mass index is 25.69 kg/m .    Physical Exam   GENERAL: healthy, alert and no distress  NECK: no adenopathy, no asymmetry, masses, or scars and thyroid normal to palpation  RESP: lungs clear to auscultation - no rales, rhonchi or wheezes  CV: regular rate and rhythm, normal S1 S2, no S3 or S4, no murmur, click or rub, no peripheral edema and peripheral pulses strong  ABDOMEN: soft, nontender, no hepatosplenomegaly, no masses and bowel sounds normal  MS: no gross musculoskeletal defects noted, no edema              Answers submitted by the patient for this visit:  Patient Health Questionnaire (Submitted on 9/18/2023)  If you checked off any problems, how difficult have these problems made it for you to do your work, take care of things at home, or get along with other people?: Not difficult at all  PHQ9 " TOTAL SCORE: 3  General Questionnaire (Submitted on 9/18/2023)  Chief Complaint: Chronic problems general questions HPI Form  How many servings of fruits and vegetables do you eat daily?: 0-1  On average, how many sweetened beverages do you drink each day (Examples: soda, juice, sweet tea, etc.  Do NOT count diet or artificially sweetened beverages)?: 2  How many minutes a day do you exercise enough to make your heart beat faster?: 60 or more  How many days a week do you exercise enough to make your heart beat faster?: 5  How many days per week do you miss taking your medication?: 1  General Concern (Submitted on 9/18/2023)  Chief Complaint: Chronic problems general questions HPI Form  What is the reason for your visit today?: chest pain/ positive tuberculosis test  When did your symptoms begin?: More than a month  What are your symptoms?: sharp pain in lower left lung area  How would you describe these symptoms?: Moderate  Are your symptoms:: Staying the same  Have you had these symptoms before?: No

## 2023-09-18 NOTE — NURSING NOTE
Prior to immunization administration, verified patients identity using patient s name and date of birth. Please see Immunization Activity for additional information.     Screening Questionnaire for Adult Immunization    Are you sick today?   No   Do you have allergies to medications, food, a vaccine component or latex?   No   Have you ever had a serious reaction after receiving a vaccination?   No   Do you have a long-term health problem with heart, lung, kidney, or metabolic disease (e.g., diabetes), asthma, a blood disorder, no spleen, complement component deficiency, a cochlear implant, or a spinal fluid leak?  Are you on long-term aspirin therapy?   No   Do you have cancer, leukemia, HIV/AIDS, or any other immune system problem?   No   Do you have a parent, brother, or sister with an immune system problem?   No   In the past 3 months, have you taken medications that affect  your immune system, such as prednisone, other steroids, or anticancer drugs; drugs for the treatment of rheumatoid arthritis, Crohn s disease, or psoriasis; or have you had radiation treatments?   No   Have you had a seizure, or a brain or other nervous system problem?   No   During the past year, have you received a transfusion of blood or blood    products, or been given immune (gamma) globulin or antiviral drug?   No   For women: Are you pregnant or is there a chance you could become       pregnant during the next month?   No   Have you received any vaccinations in the past 4 weeks?   No     Immunization questionnaire answers were all negative.      Patient instructed to remain in clinic for 15 minutes afterwards, and to report any adverse reactions.     Screening performed by Meg Lara MA on 9/18/2023 at 12:21 PM.

## 2023-10-21 ENCOUNTER — MYC MEDICAL ADVICE (OUTPATIENT)
Dept: FAMILY MEDICINE | Facility: CLINIC | Age: 23
End: 2023-10-21
Payer: MEDICAID

## 2023-10-21 ENCOUNTER — MYC REFILL (OUTPATIENT)
Dept: FAMILY MEDICINE | Facility: CLINIC | Age: 23
End: 2023-10-21
Payer: MEDICAID

## 2023-10-21 DIAGNOSIS — R76.12 POSITIVE QUANTIFERON-TB GOLD TEST: ICD-10-CM

## 2023-10-23 RX ORDER — RIFAMPIN 300 MG/1
600 CAPSULE ORAL DAILY
Qty: 180 CAPSULE | Refills: 0 | OUTPATIENT
Start: 2023-10-23

## 2023-10-23 RX ORDER — ISONIAZID 300 MG/1
300 TABLET ORAL DAILY
Qty: 90 TABLET | Refills: 0 | OUTPATIENT
Start: 2023-10-23

## 2023-10-24 RX ORDER — ISONIAZID 300 MG/1
300 TABLET ORAL DAILY
Qty: 30 TABLET | Refills: 1 | Status: SHIPPED | OUTPATIENT
Start: 2023-10-24

## 2023-10-24 RX ORDER — RIFAMPIN 300 MG/1
600 CAPSULE ORAL DAILY
Qty: 60 CAPSULE | Refills: 1 | Status: SHIPPED | OUTPATIENT
Start: 2023-10-24

## 2023-10-24 NOTE — TELEPHONE ENCOUNTER
Spoke with pharmacy for clarification on quantity of medications that were picked up by patient:     9/20/23 rifampin was picked up - quantity 60   9/20/23 isoniazid was picked up - quantity 30     Pharmacy reports that insurance is the reason that patient was not able to  the full 90 day prescription.

## 2024-03-11 ENCOUNTER — HOSPITAL ENCOUNTER (EMERGENCY)
Facility: CLINIC | Age: 24
Discharge: HOME | End: 2024-03-11
Attending: EMERGENCY MEDICINE | Admitting: EMERGENCY MEDICINE
Payer: OTHER MISCELLANEOUS

## 2024-03-11 VITALS
DIASTOLIC BLOOD PRESSURE: 71 MMHG | OXYGEN SATURATION: 99 % | SYSTOLIC BLOOD PRESSURE: 108 MMHG | TEMPERATURE: 98.4 F | WEIGHT: 142 LBS | RESPIRATION RATE: 16 BRPM | BODY MASS INDEX: 25.15 KG/M2 | HEART RATE: 54 BPM

## 2024-03-11 DIAGNOSIS — S09.90XA INJURY OF HEAD, INITIAL ENCOUNTER: ICD-10-CM

## 2024-03-11 DIAGNOSIS — M54.2 NECK PAIN: ICD-10-CM

## 2024-03-11 PROCEDURE — 99283 EMERGENCY DEPT VISIT LOW MDM: CPT | Performed by: EMERGENCY MEDICINE

## 2024-03-11 PROCEDURE — 250N000013 HC RX MED GY IP 250 OP 250 PS 637: Performed by: EMERGENCY MEDICINE

## 2024-03-11 RX ORDER — METHOCARBAMOL 500 MG/1
500 TABLET, FILM COATED ORAL 4 TIMES DAILY PRN
Qty: 10 TABLET | Refills: 0 | Status: SHIPPED | OUTPATIENT
Start: 2024-03-11

## 2024-03-11 RX ORDER — ACETAMINOPHEN 500 MG
1000 TABLET ORAL ONCE
Status: COMPLETED | OUTPATIENT
Start: 2024-03-11 | End: 2024-03-11

## 2024-03-11 RX ADMIN — ACETAMINOPHEN 1000 MG: 500 TABLET ORAL at 17:32

## 2024-03-11 ASSESSMENT — COLUMBIA-SUICIDE SEVERITY RATING SCALE - C-SSRS
6. HAVE YOU EVER DONE ANYTHING, STARTED TO DO ANYTHING, OR PREPARED TO DO ANYTHING TO END YOUR LIFE?: NO
1. IN THE PAST MONTH, HAVE YOU WISHED YOU WERE DEAD OR WISHED YOU COULD GO TO SLEEP AND NOT WAKE UP?: NO
2. HAVE YOU ACTUALLY HAD ANY THOUGHTS OF KILLING YOURSELF IN THE PAST MONTH?: NO

## 2024-03-11 NOTE — DISCHARGE INSTRUCTIONS
I have sent a prescription for robaxin to your pharmacy. You can also take tylenol or motrin. Lidocaine patches are available over the counter and may also help.

## 2024-03-11 NOTE — ED PROVIDER NOTES
[facility] EMERGENCY DEPARTMENT ([facility])    3/11/24      ED PROVIDER NOTE     History     Chief Complaint   Patient presents with    Neck Pain     Ran into another person and hit the top of her head.  Complains of pain to left posterolateral neck following incident.     HPI  Joni Garcia is a 23 year old female who presents for evaluation of neck pain after mechanical injury.  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 There had been an issue with the patient and she ran out of the room and in the process ran into another individual and struck the top of her head. No LOC with this. No other trauma/injury reported. She has been ambulatory since then without issue. No nausea/vomiting. She states she feels otherwise feels well. She denies numbness/weakness. She notes some sore pain to the left superior shoulder over the trapezius but no other pain.     Past Medical History  Past Medical History:   Diagnosis Date    Abnormal Pap smear of cervix 04/27/2022 04/27/22     No past surgical history on file.  methocarbamol (ROBAXIN) 500 MG tablet  isoniazid (NYDRAZID) 300 MG tablet  minoxidil (LONITEN) 2.5 MG tablet  rifampin (RIFADIN) 300 MG capsule      No Known Allergies  Family History  No family history on file.  Social History   Social History     Tobacco Use    Smoking status: Never    Smokeless tobacco: Never   Vaping Use    Vaping Use: Never used   Substance Use Topics    Alcohol use: No    Drug use: No         A medically appropriate review of systems was performed with pertinent positives and negatives noted in the HPI, and all other systems negative.    Physical Exam   BP: 108/71  Pulse: 54  Temp: 98.4  F (36.9  C)  Resp: 16  Weight: 64.4 kg (142 lb)  SpO2: 99 %  Physical Exam  Constitutional:       General: She is not in acute distress.     Appearance: Normal appearance. She is not toxic-appearing.   HENT:      Head: Normocephalic and atraumatic.       Mouth/Throat:      Mouth: Mucous membranes are moist.      Pharynx: Oropharynx is clear.   Eyes:      Extraocular Movements: Extraocular movements intact.      Pupils: Pupils are equal, round, and reactive to light.   Neck:      Comments: Full ROM of the head to rotation and extension/flexion  Cardiovascular:      Rate and Rhythm: Normal rate and regular rhythm.      Heart sounds: No murmur heard.     No gallop.   Pulmonary:      Effort: Pulmonary effort is normal. No respiratory distress.      Breath sounds: No wheezing or rales.   Musculoskeletal:      Cervical back: Normal range of motion. No tenderness.      Comments: No back tenderness  No midline tenderness  No step offs or deformities  There is some mild non focal tenderness over the left trapezius without point tenderness. No other tenderness   Skin:     General: Skin is warm and dry.   Neurological:      General: No focal deficit present.      Mental Status: She is alert and oriented to person, place, and time.           ED Course, Procedures, & Data      Procedures               No results found for any visits on 03/11/24.  Medications   acetaminophen (TYLENOL) tablet 1,000 mg (has no administration in time range)     Labs Ordered and Resulted from Time of ED Arrival to Time of ED Departure - No data to display  No orders to display          Critical care was not performed.     Medical Decision Making  The patient's presentation was of low complexity (an acute and uncomplicated illness or injury).    The patient's evaluation involved:  strong consideration of a test (see separate area of note for details) that was ultimately deferred    The patient's management necessitated moderate risk (prescription drug management including medications given in the ED).    Assessment & Plan    23yoF here following a head injury. She had ran into another individual a few hours ago. Overall she was well appearing. Mechanism of injury was low energy. Exam was reassuring  and she had mo point tenderness on exam. No indication for head imaging or imaging of the C spine using Swazi decision making rules. I discussed this with the patient and also discussed options for pain management at home. She was stable for discharge. Return precautions were discussed and all questions answered.    I have reviewed the nursing notes. I have reviewed the findings, diagnosis, plan and need for follow up with the patient.    New Prescriptions    METHOCARBAMOL (ROBAXIN) 500 MG TABLET    Take 1 tablet (500 mg) by mouth 4 times daily as needed       Final diagnoses:   Injury of head, initial encounter   Neck pain         [facility] EMERGENCY DEPARTMENT  3/11/2024     Omer Holland MD  03/11/24 [id number]

## 2024-03-11 NOTE — ED TRIAGE NOTES
Triage Assessment (Adult)       Row Name 03/11/24 1700          Triage Assessment    Airway WDL WDL        Respiratory WDL    Respiratory WDL WDL        Skin Circulation/Temperature WDL    Skin Circulation/Temperature WDL WDL        Cardiac WDL    Cardiac WDL WDL        Peripheral/Neurovascular WDL    Peripheral Neurovascular WDL WDL        Cognitive/Neuro/Behavioral WDL    Cognitive/Neuro/Behavioral WDL WDL

## 2024-04-23 ENCOUNTER — OFFICE VISIT (OUTPATIENT)
Dept: MIDWIFE SERVICES | Facility: CLINIC | Age: 24
End: 2024-04-23
Payer: MEDICAID

## 2024-04-23 VITALS
HEART RATE: 88 BPM | OXYGEN SATURATION: 99 % | WEIGHT: 145.5 LBS | BODY MASS INDEX: 25.78 KG/M2 | SYSTOLIC BLOOD PRESSURE: 112 MMHG | HEIGHT: 63 IN | DIASTOLIC BLOOD PRESSURE: 68 MMHG

## 2024-04-23 DIAGNOSIS — Z11.3 SCREENING FOR GONORRHEA: ICD-10-CM

## 2024-04-23 DIAGNOSIS — Z11.59 NEED FOR HEPATITIS C SCREENING TEST: ICD-10-CM

## 2024-04-23 DIAGNOSIS — Z11.59 NEED FOR HEPATITIS B SCREENING TEST: ICD-10-CM

## 2024-04-23 DIAGNOSIS — Z00.00 ANNUAL PHYSICAL EXAM: Primary | ICD-10-CM

## 2024-04-23 DIAGNOSIS — R87.612 LGSIL ON PAP SMEAR OF CERVIX: ICD-10-CM

## 2024-04-23 DIAGNOSIS — Z11.4 SCREENING FOR HIV (HUMAN IMMUNODEFICIENCY VIRUS): ICD-10-CM

## 2024-04-23 DIAGNOSIS — Z11.8 SPECIAL SCREENING EXAMINATION FOR CHLAMYDIAL DISEASE: ICD-10-CM

## 2024-04-23 DIAGNOSIS — Z11.3 SCREENING EXAMINATION FOR STI: ICD-10-CM

## 2024-04-23 PROCEDURE — 88175 CYTOPATH C/V AUTO FLUID REDO: CPT | Performed by: ADVANCED PRACTICE MIDWIFE

## 2024-04-23 PROCEDURE — 87389 HIV-1 AG W/HIV-1&-2 AB AG IA: CPT | Performed by: ADVANCED PRACTICE MIDWIFE

## 2024-04-23 PROCEDURE — 99385 PREV VISIT NEW AGE 18-39: CPT | Performed by: ADVANCED PRACTICE MIDWIFE

## 2024-04-23 PROCEDURE — 86780 TREPONEMA PALLIDUM: CPT | Performed by: ADVANCED PRACTICE MIDWIFE

## 2024-04-23 PROCEDURE — 87491 CHLMYD TRACH DNA AMP PROBE: CPT | Performed by: ADVANCED PRACTICE MIDWIFE

## 2024-04-23 PROCEDURE — 87591 N.GONORRHOEAE DNA AMP PROB: CPT | Performed by: ADVANCED PRACTICE MIDWIFE

## 2024-04-23 PROCEDURE — 36415 COLL VENOUS BLD VENIPUNCTURE: CPT | Performed by: ADVANCED PRACTICE MIDWIFE

## 2024-04-23 PROCEDURE — 87340 HEPATITIS B SURFACE AG IA: CPT | Performed by: ADVANCED PRACTICE MIDWIFE

## 2024-04-23 PROCEDURE — 86803 HEPATITIS C AB TEST: CPT | Performed by: ADVANCED PRACTICE MIDWIFE

## 2024-04-23 RX ORDER — CHOLECALCIFEROL (VITAMIN D3) 50 MCG
2 TABLET ORAL
COMMUNITY
Start: 2024-02-09

## 2024-04-23 RX ORDER — FERROUS GLUCONATE 324(38)MG
324 TABLET ORAL DAILY
COMMUNITY
Start: 2024-03-31

## 2024-04-23 RX ORDER — KETOCONAZOLE 20 MG/ML
SHAMPOO TOPICAL DAILY PRN
COMMUNITY
Start: 2024-01-05

## 2024-04-23 RX ORDER — BETAMETHASONE DIPROPIONATE 0.5 MG/G
LOTION TOPICAL DAILY
COMMUNITY
Start: 2024-01-05

## 2024-04-23 RX ORDER — FLUOCINOLONE ACETONIDE 0.11 MG/ML
OIL TOPICAL DAILY
COMMUNITY
Start: 2024-01-05

## 2024-04-23 ASSESSMENT — PATIENT HEALTH QUESTIONNAIRE - PHQ9: SUM OF ALL RESPONSES TO PHQ QUESTIONS 1-9: 11

## 2024-04-23 NOTE — PROGRESS NOTES
"Joni is a 24 year old  female who presents for annual exam. She has questions today about when to start mammograms. Her mother  of breast CA when she was 44, diagnosed at 40. Joni would like STI testing, and has had worsening depression symptoms in the last 4 weeks. She has taken sertriline in the past, but didn't feel like it helped her. She has a therapist that she seen in the past, and planning to return to that person for therapy. Joni is due for her PAP. She has history of LSIL on her PAP in .    Menses are regular q 28-30 days and recently has been irregular (February-March) and normal lasting 5 days.  Menses flow: normal.  Patient's last menstrual period was 04/10/2024 (exact date).. Using none for contraception.  She is not currently considering pregnancy.  Besides routine health maintenance, she has no other health concerns today .  GYNECOLOGIC HISTORY:  Menarche: 11  Age at first intercourse: 21 Number of lifetime partners: 6  Joni is not sexually active with male partner(s) and is not currently in monogamous relationship.    History sexually transmitted infections:Bacterial vaginosis  STI testing offered?  Accepted  BRENNEN exposure: No  History of abnormal Pap smear: NO - age 21-29 PAP every 3 years recommended  Family history of breast CA: Yes (Please explain): Mother  Family history of uterine/ovarian CA: No    Family history of colon CA: No    HEALTH MAINTENANCE:  Cholesterol: (No results found for: \"CHOL\" History of abnormal lipids: No  Mammo: N/A . History of abnormal Mammo: Not applicable.  Regular Self Breast Exams: No  Calcium/Vitamin D intake: source:  dairy, dietary supplement(s) Adequate? Yes  TSH: (  TSH   Date Value Ref Range Status   2019 1.33 0.30 - 5.00 uIU/mL Final    )  Pap; (No results found for: \"PAP\" )    HISTORY:  OB History    Para Term  AB Living   0 0 0 0 0 0   SAB IAB Ectopic Multiple Live Births   0 0 0 0 0     Past Medical History: "   Diagnosis Date    Abnormal Pap smear of cervix 04/27/2022 04/27/22     No past surgical history on file.  No family history on file.  Social History     Socioeconomic History    Marital status: Single     Spouse name: None    Number of children: None    Years of education: None    Highest education level: None   Tobacco Use    Smoking status: Never    Smokeless tobacco: Never   Vaping Use    Vaping status: Never Used   Substance and Sexual Activity    Alcohol use: No    Drug use: No    Sexual activity: Never   Social History Narrative    Dad- Alfonzo Horn- Jennie Wilhelm       Current Outpatient Medications:     betamethasone dipropionate (DIPROSONE) 0.05 % external lotion, Apply topically daily, Disp: , Rfl:     ferrous gluconate (FERGON) 324 (38 Fe) MG tablet, Take 324 mg by mouth daily, Disp: , Rfl:     fluocinolone acetonide (DERMA SMOOTHE/FS BODY) 0.01 % external oil, Apply topically daily, Disp: , Rfl:     ketoconazole (NIZORAL) 2 % external shampoo, Apply topically daily as needed for itching, Disp: , Rfl:     minoxidil (LONITEN) 2.5 MG tablet, Take 2.5 mg by mouth daily as directed, Disp: , Rfl:     VITAMIN D3 50 MCG (2000 UT) tablet, Take 2 tablets by mouth daily at 2 pm, Disp: , Rfl:     isoniazid (NYDRAZID) 300 MG tablet, Take 1 tablet (300 mg) by mouth daily (Patient not taking: Reported on 4/23/2024), Disp: 30 tablet, Rfl: 1    methocarbamol (ROBAXIN) 500 MG tablet, Take 1 tablet (500 mg) by mouth 4 times daily as needed (Patient not taking: Reported on 4/23/2024), Disp: 10 tablet, Rfl: 0    rifampin (RIFADIN) 300 MG capsule, Take 2 capsules (600 mg) by mouth daily (Patient not taking: Reported on 4/23/2024), Disp: 60 capsule, Rfl: 1   No Known Allergies    Past medical, surgical, social and family history were reviewed and updated in EPIC.    ROS:   C:     NEGATIVE for fever, chills, change in weight  I:       NEGATIVE for worrisome rashes, moles or lesions  E:     NEGATIVE for vision changes or  "irritation  E/M: NEGATIVE for ear, mouth and throat problems  R:     NEGATIVE for significant cough or SOB  CV:   NEGATIVE for chest pain, palpitations or peripheral edema  GI:     NEGATIVE for nausea, abdominal pain, heartburn, or change in bowel habits  :   NEGATIVE for frequency, dysuria, hematuria, vaginal discharge, or irregular bleeding  M:     NEGATIVE for significant arthralgias or myalgia  N:      NEGATIVE for weakness, dizziness or paresthesias  E:      NEGATIVE for temperature intolerance, skin/hair changes  P:     POSITIVE worsened depression symptoms in the last 4 weeks.    PHQ-9 score:        4/23/2024     2:18 PM   PHQ   PHQ-9 Total Score 11   Q9: Thoughts of better off dead/self-harm past 2 weeks Not at all         2/5/2021    11:00 AM 4/23/2021     9:00 AM 4/27/2022     2:22 PM   LILLY-7 SCORE   Total Score 1 4 6        EXAM:  /68   Pulse 88   Ht 1.6 m (5' 3\")   Wt 66 kg (145 lb 8 oz)   LMP 04/10/2024 (Exact Date)   SpO2 99%   BMI 25.77 kg/m     BMI: Body mass index is 25.77 kg/m .  Constitutional: healthy, alert and no distress  Head: Normocephalic. No masses, lesions, tenderness or abnormalities  Neck: Neck supple. Trachea midline. No adenopathy. Thyroid symmetric, normal size.   Cardiovascular: RRR.   Respiratory: Negative.   Breast: No nodularity, asymmetry or nipple discharge bilaterally.  Gastrointestinal: Abdomen soft, non-tender, non-distended. No masses, organomegaly.  Musculoskeletal: extremities normal  Skin: no suspicious lesions or rashes  Psychiatric: Affect appropriate, cooperative,mentation appears normal.   Pelvic Exam:  Vulva: No external lesions, normal hair distribution, no adenopathy  Vagina: Moist, pink, no abnormal discharge, well rugated, no lesions  Cervix: Pap smear is taken, parous, smooth, pink, no visible lesions        COUNSELING:   Reviewed preventive health counseling, as reflected in patient instructions  Special attention given to:        Consider Hep C " screening for all patients one time for ages 18-79 years       Syphilis screening for high risk patients       HIV screeninx in teen years, 1x in adult years, and at intervals if high risk       Mental health concerns   reports that she has never smoked. She has never used smokeless tobacco.    Body mass index is 25.77 kg/m .    FRAX Risk Assessment    ASSESSMENT:  24 year old female with satisfactory annual exam  (Z00.00) Annual physical exam  (primary encounter diagnosis)    (Z11.3) Screening for gonorrhea  Plan: Chlamydia & Gonorrhea by PCR, [facility] -         Clinic Collect    (Z11.8) Special screening examination for chlamydial disease  Plan: Chlamydia & Gonorrhea by PCR, [facility] -         Clinic Collect    (Z11.3) Screening examination for STI  Plan: Treponema Abs w Reflex to RPR and Titer    (Z11.4) Screening for HIV (human immunodeficiency virus)  Plan: HIV Antigen Antibody Combo    (Z11.59) Need for hepatitis C screening test  Plan: Hepatitis C antibody    (Z11.59) Need for hepatitis B screening test  Plan: Hepatitis B surface antigen    (R87.612) LGSIL on Pap smear of cervix  Plan: Pap diagnostic reflex to HPV if ASCUS    Jayson Ayala CNM

## 2024-04-24 ENCOUNTER — MYC MEDICAL ADVICE (OUTPATIENT)
Dept: MIDWIFE SERVICES | Facility: CLINIC | Age: 24
End: 2024-04-24
Payer: MEDICAID

## 2024-04-24 DIAGNOSIS — A74.9 CHLAMYDIA INFECTION: Primary | ICD-10-CM

## 2024-04-24 LAB
C TRACH DNA SPEC QL PROBE+SIG AMP: POSITIVE
HBV SURFACE AG SERPL QL IA: NONREACTIVE
HCV AB SERPL QL IA: NONREACTIVE
HIV 1+2 AB+HIV1 P24 AG SERPL QL IA: NONREACTIVE
N GONORRHOEA DNA SPEC QL NAA+PROBE: NEGATIVE
T PALLIDUM AB SER QL: NONREACTIVE

## 2024-04-24 RX ORDER — DOXYCYCLINE 100 MG/1
100 CAPSULE ORAL 2 TIMES DAILY
Qty: 14 CAPSULE | Refills: 0 | Status: SHIPPED | OUTPATIENT
Start: 2024-04-24

## 2024-04-29 ENCOUNTER — PATIENT OUTREACH (OUTPATIENT)
Dept: MIDWIFE SERVICES | Facility: CLINIC | Age: 24
End: 2024-04-29
Payer: MEDICAID

## 2024-04-29 DIAGNOSIS — R87.612 PAPANICOLAOU SMEAR OF CERVIX WITH LOW GRADE SQUAMOUS INTRAEPITHELIAL LESION (LGSIL): Primary | ICD-10-CM

## 2024-04-29 LAB
BKR LAB AP GYN ADEQUACY: NORMAL
BKR LAB AP GYN INTERPRETATION: NORMAL
BKR LAB AP HPV REFLEX: NORMAL
BKR LAB AP LMP: NORMAL
BKR LAB AP PREVIOUS ABNL DX: NORMAL
BKR LAB AP PREVIOUS ABNORMAL: NORMAL
PATH REPORT.COMMENTS IMP SPEC: NORMAL
PATH REPORT.COMMENTS IMP SPEC: NORMAL
PATH REPORT.RELEVANT HX SPEC: NORMAL

## 2024-09-11 ENCOUNTER — OFFICE VISIT (OUTPATIENT)
Dept: OPTOMETRY | Facility: CLINIC | Age: 24
End: 2024-09-11
Payer: COMMERCIAL

## 2024-09-11 DIAGNOSIS — H52.221 REGULAR ASTIGMATISM OF RIGHT EYE: ICD-10-CM

## 2024-09-11 DIAGNOSIS — Q14.1 CONGENITAL HYPERTROPHY OF RETINAL PIGMENT EPITHELIUM OF RIGHT EYE: ICD-10-CM

## 2024-09-11 DIAGNOSIS — H52.13 MYOPIA OF BOTH EYES: ICD-10-CM

## 2024-09-11 DIAGNOSIS — Z01.00 ENCOUNTER FOR EXAMINATION OF EYES AND VISION WITHOUT ABNORMAL FINDINGS: Primary | ICD-10-CM

## 2024-09-11 PROCEDURE — 92015 DETERMINE REFRACTIVE STATE: CPT | Performed by: OPTOMETRIST

## 2024-09-11 PROCEDURE — 92004 COMPRE OPH EXAM NEW PT 1/>: CPT | Performed by: OPTOMETRIST

## 2024-09-11 ASSESSMENT — CUP TO DISC RATIO
OD_RATIO: 0.5
OS_RATIO: 0.45

## 2024-09-11 ASSESSMENT — CONF VISUAL FIELD
OS_INFERIOR_TEMPORAL_RESTRICTION: 0
OS_SUPERIOR_TEMPORAL_RESTRICTION: 0
OD_INFERIOR_NASAL_RESTRICTION: 0
OS_NORMAL: 1
OD_NORMAL: 1
OD_SUPERIOR_TEMPORAL_RESTRICTION: 0
METHOD: COUNTING FINGERS
OS_INFERIOR_NASAL_RESTRICTION: 0
OD_INFERIOR_TEMPORAL_RESTRICTION: 0
OS_SUPERIOR_NASAL_RESTRICTION: 0
OD_SUPERIOR_NASAL_RESTRICTION: 0

## 2024-09-11 ASSESSMENT — REFRACTION_CURRENTRX
OD_AXIS: 180
OS_DIAMETER: 14.1
OS_BASECURVE: 8.6
OD_BASECURVE: 8.6
OD_BRAND: COOPER CLARITI 1 DAY TORIC BC 8.6, D 14.3
OS_SPHERE: -1.50
OD_SPHERE: -1.00
OS_BRAND: COOPER CLARITI 1 DAY BC 8.6, D 14.1
OD_CYLINDER: -0.75
OD_DIAMETER: 14.3
OS_CYLINDER: SPHERE

## 2024-09-11 ASSESSMENT — SLIT LAMP EXAM - LIDS
COMMENTS: NORMAL
COMMENTS: NORMAL

## 2024-09-11 ASSESSMENT — VISUAL ACUITY
OD_SC: 20/20
OD_CC+: -1
OS_SC: 20/20
METHOD: SNELLEN - LINEAR
OD_SC+: -1
OS_CC: 20/20
OS_CC: 20/20
OD_SC: 20/80
CORRECTION_TYPE: GLASSES
OS_SC: 20/80
OD_CC: 20/20
OD_CC: 20/20

## 2024-09-11 ASSESSMENT — REFRACTION_WEARINGRX
OS_CYLINDER: SPHERE
OS_SPHERE: -2.00
OD_AXIS: 090
OD_SPHERE: -2.50
SPECS_TYPE: SVL
OD_CYLINDER: +1.25

## 2024-09-11 ASSESSMENT — EXTERNAL EXAM - RIGHT EYE: OD_EXAM: NORMAL

## 2024-09-11 ASSESSMENT — REFRACTION_MANIFEST
OS_SPHERE: -2.50
OD_SPHERE: -2.50
OD_AXIS: 104
OS_CYLINDER: SPHERE
OD_CYLINDER: +0.75

## 2024-09-11 ASSESSMENT — TONOMETRY
IOP_METHOD: APPLANATION
OS_IOP_MMHG: 10
OD_IOP_MMHG: 09

## 2024-09-11 ASSESSMENT — EXTERNAL EXAM - LEFT EYE: OS_EXAM: NORMAL

## 2024-09-11 NOTE — PATIENT INSTRUCTIONS
Updated glasses prescription provided today.   Allow 2 weeks to adapt to the new glasses.     Return for contact lens fitting, if desired.   Return in 1 year for a comprehensive eye exam.    The effects of the dilating drops last for 4- 6 hours.  You will be more sensitive to light and vision will be blurry up close.  Mydriatic sunglasses were given if needed.     Zay Haskins, OD  [facility]  [address] [place] [address]. [place]  [place]  [id number]    [phone]

## 2024-09-11 NOTE — PROGRESS NOTES
Chief Complaint   Patient presents with    Annual Eye Exam    New Eval For Contact Lens        Previous contact lens wearer? Yes: Clariti 1-day Toric right eye, sph left eye   Comfort of contact lenses :Good   Satisfied with current lenses: Yes - will come back to update CL Rx  secondary to cost of fitting - has a good amount of CL's left    Last Eye Exam: 11/4/2022  Dilated Previously: Yes, side effects of dilation explained today    What are you currently using to see?  Glasses and contacts- wears glasses most of the time, CL's for special occasions     Distance Vision Acuity: Satisfied with vision    Near Vision Acuity: Satisfied with vision while reading and using computer with glasses and contacts     Eye Comfort: good - sometimes dry  Do you use eye drops? 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       OBJECTIVE: See Ophthalmology exam    ASSESSMENT:    ICD-10-CM    1. Encounter for examination of eyes and vision without abnormal findings  Z01.00       2. 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 Myopia of both eyes  H52.13       4. Regular astigmatism of right eye  H52.221          PLAN:     Patient Instructions   Updated glasses prescription provided today.   Allow 2 weeks to adapt to the new glasses.     Return for contact lens fitting, if desired.   Return in 1 year for a comprehensive eye exam.    The effects of the dilating drops last for 4- 6 hours.  You will be more sensitive to light and vision will be blurry up close.  Mydriatic sunglasses were given if needed.     Zay Haskins, OD  [address] [place]  [address]    [phone]

## 2024-09-11 NOTE — LETTER
9/11/2024      Joni Garcia  [address] [place] [address]      Dear Colleague,    Thank you for referring your patient, Joni Garcia, to the [facility]. Please see a copy of my visit note below.    Chief Complaint   Patient presents with     Annual Eye Exam     New Eval For Contact Lens        Previous contact lens wearer? Yes: Clariti 1-day Toric right eye, sph left eye   Comfort of contact lenses :Good   Satisfied with current lenses: Yes - will come back to update CL Rx  secondary to cost of fitting - has a good amount of CL's left    Last Eye Exam: 11/4/2022  Dilated Previously: Yes, side effects of dilation explained today    What are you currently using to see?  Glasses and contacts- wears glasses most of the time, CL's for special occasions     Distance Vision Acuity: Satisfied with vision    Near Vision Acuity: Satisfied with vision while reading and using computer with glasses and contacts     Eye Comfort: good - sometimes dry  Do you use eye drops? 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       OBJECTIVE: See Ophthalmology exam    ASSESSMENT:    ICD-10-CM    1. Encounter for examination of eyes and vision without abnormal findings  Z01.00       2. 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 Myopia of both eyes  H52.13       4. Regular astigmatism of right eye  H52.221          PLAN:     Patient Instructions   Updated glasses prescription provided today.   Allow 2 weeks to adapt to the new glasses.     Return for contact lens fitting, if desired.   Return in 1 year for a comprehensive eye exam.    The effects of the dilating drops last for 4- 6 hours.  You will be more sensitive to light and vision will be blurry up close.   Mydriatic sunglasses were given if needed.     Zay Haskins OD  [address] [place]  [address]    [phone]              Again, thank you for allowing me to participate in the care of your patient.        Sincerely,        Zay Haskins OD

## 2024-11-05 ASSESSMENT — PATIENT HEALTH QUESTIONNAIRE - PHQ9: SUM OF ALL RESPONSES TO PHQ QUESTIONS 1-9: 13

## 2024-11-22 ENCOUNTER — MYC MEDICAL ADVICE (OUTPATIENT)
Dept: MIDWIFE SERVICES | Facility: CLINIC | Age: 24
End: 2024-11-22
Payer: COMMERCIAL

## 2024-11-22 ASSESSMENT — ANXIETY QUESTIONNAIRES
7. FEELING AFRAID AS IF SOMETHING AWFUL MIGHT HAPPEN: NOT AT ALL
IF YOU CHECKED OFF ANY PROBLEMS ON THIS QUESTIONNAIRE, HOW DIFFICULT HAVE THESE PROBLEMS MADE IT FOR YOU TO DO YOUR WORK, TAKE CARE OF THINGS AT HOME, OR GET ALONG WITH OTHER PEOPLE: SOMEWHAT DIFFICULT
GAD7 TOTAL SCORE: 4
7. FEELING AFRAID AS IF SOMETHING AWFUL MIGHT HAPPEN: NOT AT ALL
GAD7 TOTAL SCORE: 4
6. BECOMING EASILY ANNOYED OR IRRITABLE: NOT AT ALL
5. BEING SO RESTLESS THAT IT IS HARD TO SIT STILL: NOT AT ALL
3. WORRYING TOO MUCH ABOUT DIFFERENT THINGS: SEVERAL DAYS
8. IF YOU CHECKED OFF ANY PROBLEMS, HOW DIFFICULT HAVE THESE MADE IT FOR YOU TO DO YOUR WORK, TAKE CARE OF THINGS AT HOME, OR GET ALONG WITH OTHER PEOPLE?: SOMEWHAT DIFFICULT
GAD7 TOTAL SCORE: 4
1. FEELING NERVOUS, ANXIOUS, OR ON EDGE: SEVERAL DAYS
2. NOT BEING ABLE TO STOP OR CONTROL WORRYING: SEVERAL DAYS
4. TROUBLE RELAXING: SEVERAL DAYS

## 2024-11-22 ASSESSMENT — PATIENT HEALTH QUESTIONNAIRE - PHQ9
SUM OF ALL RESPONSES TO PHQ QUESTIONS 1-9: 16
10. IF YOU CHECKED OFF ANY PROBLEMS, HOW DIFFICULT HAVE THESE PROBLEMS MADE IT FOR YOU TO DO YOUR WORK, TAKE CARE OF THINGS AT HOME, OR GET ALONG WITH OTHER PEOPLE: SOMEWHAT DIFFICULT
SUM OF ALL RESPONSES TO PHQ QUESTIONS 1-9: 16

## 2024-11-22 NOTE — TELEPHONE ENCOUNTER
Pt said she had taken sertraline in the past but stopped and hasn't felt mental health improved since visit  Is there a good time to set up a virtual?  Routing to provider to advise.  Eva William RN on 11/22/2024 at 4:04 PM

## 2024-11-25 ENCOUNTER — IMMUNIZATION (OUTPATIENT)
Dept: FAMILY MEDICINE | Facility: CLINIC | Age: 24
End: 2024-11-25
Payer: COMMERCIAL

## 2024-11-25 VITALS — TEMPERATURE: 98.2 F

## 2024-11-25 DIAGNOSIS — Z23 ENCOUNTER FOR IMMUNIZATION: Primary | ICD-10-CM

## 2024-11-25 PROCEDURE — 90480 ADMN SARSCOV2 VAC 1/ONLY CMP: CPT

## 2024-11-25 PROCEDURE — 99207 PR NO CHARGE NURSE ONLY: CPT

## 2024-11-25 PROCEDURE — 90471 IMMUNIZATION ADMIN: CPT

## 2024-11-25 PROCEDURE — 91320 SARSCV2 VAC 30MCG TRS-SUC IM: CPT

## 2024-11-25 PROCEDURE — 90656 IIV3 VACC NO PRSV 0.5 ML IM: CPT

## 2024-11-25 NOTE — PROGRESS NOTES
Prior to immunization administration, verified patients identity using patient s name and date of birth. Please see Immunization Activity for additional information.     Is the patient's temperature normal (100.5 or less)? Yes     Patient MEETS CRITERIA. PROCEED with vaccine administration.          11/25/2024   INFLUENZA   Would you like to receive the flu shot or the nasal flu vaccine today? Flu Shot   Have you had a serious reaction to a flu vaccine or something in a flu vaccine? No   Have you had Guillain-Addington syndrome within 6 weeks of getting a vaccine? No   Have you received a bone marrow transplant within the previous 6 months? No        Prior to immunization administration, verified patients identity using patient s name and date of birth. Please see Immunization Activity for additional information.     Screening Questionnaire for Adult Immunization    Are you sick today?   No   Do you have allergies to medications, food, a vaccine component or latex?   No   Have you ever had a serious reaction after receiving a vaccination?   No   Do you have a long-term health problem with heart, lung, kidney, or metabolic disease (e.g., diabetes), asthma, a blood disorder, no spleen, complement component deficiency, a cochlear implant, or a spinal fluid leak?  Are you on long-term aspirin therapy?   No   Do you have cancer, leukemia, HIV/AIDS, or any other immune system problem?   No   Do you have a parent, brother, or sister with an immune system problem?   No   In the past 3 months, have you taken medications that affect  your immune system, such as prednisone, other steroids, or anticancer drugs; drugs for the treatment of rheumatoid arthritis, Crohn s disease, or psoriasis; or have you had radiation treatments?   No   Have you had a seizure, or a brain or other nervous system problem?   No   During the past year, have you received a transfusion of blood or blood    products, or been given immune (gamma) globulin or  antiviral drug?   No   For women: Are you pregnant or is there a chance you could become       pregnant during the next month?   No   Have you received any vaccinations in the past 4 weeks?   No     Immunization questionnaire answers were all negative.    I have reviewed the following standing orders:   This patient is due and qualifies for the Covid-19 vaccine.     Click here for COVID-19 Standing Order    I have reviewed the vaccines inclusion and exclusion criteria; No concerns regarding eligibility.     Patient instructed to remain in clinic for 15 minutes afterwards, and to report any adverse reactions.     Screening performed by Ramona Roland MA on 11/25/2024 at 4:05 PM.         Patient MEETS CRITERIA. PROCEED with vaccine administration.        Patient instructed to remain in clinic for 15 minutes afterwards, and to report any adverse reactions.      Link to Ancillary Visit Immunization Standing Orders SmartSet     Screening performed by Ramona Roland MA on 11/25/2024 at 3:50 PM.

## 2024-12-02 ENCOUNTER — VIRTUAL VISIT (OUTPATIENT)
Dept: MIDWIFE SERVICES | Facility: CLINIC | Age: 24
End: 2024-12-02
Payer: COMMERCIAL

## 2024-12-02 DIAGNOSIS — F32.1 CURRENT MODERATE EPISODE OF MAJOR DEPRESSIVE DISORDER WITHOUT PRIOR EPISODE (H): Primary | ICD-10-CM

## 2024-12-02 DIAGNOSIS — Z71.1 MENTAL HEALTH-RELATED COMPLAINT: ICD-10-CM

## 2024-12-02 PROCEDURE — 99214 OFFICE O/P EST MOD 30 MIN: CPT | Mod: 95 | Performed by: ADVANCED PRACTICE MIDWIFE

## 2024-12-02 RX ORDER — FLUOXETINE 10 MG/1
10 CAPSULE ORAL DAILY
Qty: 60 CAPSULE | Refills: 0 | Status: SHIPPED | OUTPATIENT
Start: 2024-12-02

## 2024-12-02 ASSESSMENT — PATIENT HEALTH QUESTIONNAIRE - PHQ9
5. POOR APPETITE OR OVEREATING: NOT AT ALL
SUM OF ALL RESPONSES TO PHQ QUESTIONS 1-9: 7

## 2024-12-02 ASSESSMENT — ANXIETY QUESTIONNAIRES
6. BECOMING EASILY ANNOYED OR IRRITABLE: SEVERAL DAYS
5. BEING SO RESTLESS THAT IT IS HARD TO SIT STILL: NOT AT ALL
GAD7 TOTAL SCORE: 3
2. NOT BEING ABLE TO STOP OR CONTROL WORRYING: SEVERAL DAYS
3. WORRYING TOO MUCH ABOUT DIFFERENT THINGS: SEVERAL DAYS
7. FEELING AFRAID AS IF SOMETHING AWFUL MIGHT HAPPEN: NOT AT ALL
IF YOU CHECKED OFF ANY PROBLEMS ON THIS QUESTIONNAIRE, HOW DIFFICULT HAVE THESE PROBLEMS MADE IT FOR YOU TO DO YOUR WORK, TAKE CARE OF THINGS AT HOME, OR GET ALONG WITH OTHER PEOPLE: SOMEWHAT DIFFICULT
1. FEELING NERVOUS, ANXIOUS, OR ON EDGE: NOT AT ALL
GAD7 TOTAL SCORE: 3

## 2024-12-02 NOTE — PROGRESS NOTES
"Joni is a 24 year old who is being evaluated via a billable video visit.    How would you like to obtain your AVS? MyChart  If the video visit is dropped, the invitation should be resent by: Text to cell phone: [phone]  Will anyone else be joining your video visit? No    Assessment & Plan     (F32.1) Current moderate episode of major depressive disorder without prior episode (H)  (primary encounter diagnosis)  Plan: FLUoxetine (PROZAC) 10 MG capsule        Will plan to check in after about 30 days of treatment to see if increasing dosage is needed. Discussed that 10mg is a very low dose for treatment of depression, and dose increase may be warranted. If she is not tolerating, having undesired side effects, or having suicidal ideation, she should contact the clinic.    (Z71.1) Mental health-related complaint  Plan: Adult Mental Health  Referral        Requesting ADHD diagnostic testing      BMI  Estimated body mass index is 25.77 kg/m  as calculated from the following:    Height as of 4/23/24: 1.6 m (5' 3\").    Weight as of 4/23/24: 66 kg (145 lb 8 oz).       Socorro Quinones is a 24 year old, presenting for the following health issues:  No chief complaint on file.    ALEAJNDRO Quinones is a 25yo nulliparous woman with concerns related to on-going depressive symptoms. She was seen in April for her annual exam, and noted to have elevated PHQ-9 at that time. She verbalized some depression/mood symptoms, but wanted to try therapy before medications. She has been seeing a therapist regularly, and is still struggling with her depression. She feels that her symptoms are somewhat worse now, and that is reflected in her PHQ-9 score. She is having a hard time with motivation, and a hard time just doing daily tasks. She is also interested in ADHD testing after discussions with her therapist. She has tried Sertriline in the past, and stopped taking it. Willing to try something new. Denies any suicidal " thoughts/plans.         Objective       Vitals:  No vitals were obtained today due to virtual visit.    Physical Exam   GENERAL: alert and no distress  EYES: Eyes grossly normal to inspection.  No discharge or erythema, or obvious scleral/conjunctival abnormalities.  RESP: No audible wheeze, cough, or visible cyanosis.    SKIN: Visible skin clear. No significant rash, abnormal pigmentation or lesions.  NEURO: Cranial nerves grossly intact.  Mentation and speech appropriate for age.  PSYCH: Appropriate affect, tone, and pace of words        4/23/2024     2:18 PM 11/22/2024     4:09 PM 12/2/2024     1:33 PM   PHQ   PHQ-9 Total Score 11 16  7   Q9: Thoughts of better off dead/self-harm past 2 weeks Not at all Several days  Not at all   F/U: Thoughts of suicide or self-harm  No     F/U: Safety concerns  No         Patient-reported          4/27/2022     2:22 PM 11/22/2024     4:08 PM 12/2/2024     1:33 PM   LILLY-7 SCORE   Total Score  4 (minimal anxiety)    Total Score 6 4  3       Patient-reported        Video-Visit Details    Type of service:  Video Visit   Originating Location (pt. Location): [facility]    Distant Location (provider location):  On-site  Platform used for Video Visit: [facility]  Signed Electronically by: Jayson Ayala CNM    Call start: [phone]  Call end: [phone]  Total time: 7 min    Jayson Ayala CNM

## 2025-02-12 ENCOUNTER — VIRTUAL VISIT (OUTPATIENT)
Dept: PSYCHOLOGY | Facility: CLINIC | Age: 25
End: 2025-02-12
Attending: ADVANCED PRACTICE MIDWIFE
Payer: COMMERCIAL

## 2025-02-12 DIAGNOSIS — F33.0 MAJOR DEPRESSIVE DISORDER, RECURRENT EPISODE, MILD: Primary | ICD-10-CM

## 2025-02-12 PROCEDURE — 90791 PSYCH DIAGNOSTIC EVALUATION: CPT | Mod: 95 | Performed by: PSYCHOLOGIST

## 2025-02-12 ASSESSMENT — PATIENT HEALTH QUESTIONNAIRE - PHQ9
SUM OF ALL RESPONSES TO PHQ QUESTIONS 1-9: 7
10. IF YOU CHECKED OFF ANY PROBLEMS, HOW DIFFICULT HAVE THESE PROBLEMS MADE IT FOR YOU TO DO YOUR WORK, TAKE CARE OF THINGS AT HOME, OR GET ALONG WITH OTHER PEOPLE: SOMEWHAT DIFFICULT
5. POOR APPETITE OR OVEREATING: MORE THAN HALF THE DAYS
SUM OF ALL RESPONSES TO PHQ QUESTIONS 1-9: 7

## 2025-02-12 ASSESSMENT — ANXIETY QUESTIONNAIRES
7. FEELING AFRAID AS IF SOMETHING AWFUL MIGHT HAPPEN: NOT AT ALL
GAD7 TOTAL SCORE: 2
6. BECOMING EASILY ANNOYED OR IRRITABLE: NOT AT ALL
3. WORRYING TOO MUCH ABOUT DIFFERENT THINGS: NOT AT ALL
IF YOU CHECKED OFF ANY PROBLEMS ON THIS QUESTIONNAIRE, HOW DIFFICULT HAVE THESE PROBLEMS MADE IT FOR YOU TO DO YOUR WORK, TAKE CARE OF THINGS AT HOME, OR GET ALONG WITH OTHER PEOPLE: NOT DIFFICULT AT ALL
5. BEING SO RESTLESS THAT IT IS HARD TO SIT STILL: NOT AT ALL
GAD7 TOTAL SCORE: 2
2. NOT BEING ABLE TO STOP OR CONTROL WORRYING: NOT AT ALL
1. FEELING NERVOUS, ANXIOUS, OR ON EDGE: NOT AT ALL

## 2025-02-12 ASSESSMENT — COLUMBIA-SUICIDE SEVERITY RATING SCALE - C-SSRS
2. HAVE YOU ACTUALLY HAD ANY THOUGHTS OF KILLING YOURSELF?: NO
6. HAVE YOU EVER DONE ANYTHING, STARTED TO DO ANYTHING, OR PREPARED TO DO ANYTHING TO END YOUR LIFE?: NO
TOTAL  NUMBER OF ABORTED OR SELF INTERRUPTED ATTEMPTS LIFETIME: NO
TOTAL  NUMBER OF INTERRUPTED ATTEMPTS LIFETIME: NO
1. HAVE YOU WISHED YOU WERE DEAD OR WISHED YOU COULD GO TO SLEEP AND NOT WAKE UP?: NO
ATTEMPT LIFETIME: NO
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
## 2025-02-19 ENCOUNTER — DOCUMENTATION ONLY (OUTPATIENT)
Dept: PSYCHOLOGY | Facility: CLINIC | Age: 25
End: 2025-02-19
Payer: COMMERCIAL

## 2025-02-19 NOTE — PROGRESS NOTES
Name: Joni Garcia  MRN:?[id number]  :?2000      Client completed the [place] Multiphasic Personality Inventory-3 (MMPI-3), a self-report personality inventory, as part of her evaluation. Validity scales indicate that the client was able to comprehend and respond relevantly to the test items. Her profile suggests a low level of emotional distress. She reports engaging in non-planful, impulsive behaviors. She reports engaging in compulsive behaviors including repetitive checking.

## 2025-02-20 ENCOUNTER — VIRTUAL VISIT (OUTPATIENT)
Dept: PSYCHOLOGY | Facility: CLINIC | Age: 25
End: 2025-02-20
Payer: COMMERCIAL

## 2025-02-20 DIAGNOSIS — F33.0 MAJOR DEPRESSIVE DISORDER, RECURRENT EPISODE, MILD: Primary | ICD-10-CM

## 2025-02-20 NOTE — PROGRESS NOTES
Progress Note     Client Name:  Joni Garcia Date: 2/20/2025       Service Type: Individual    Telemedicine Visit: The patient's condition can be safely assessed and treated via synchronous audio and visual telemedicine encounter.      Reason for Telemedicine Visit: Services only offered telehealth    Originating Site (Patient Location): Patient's home        Distant Location (provider location):  Off-site    Consent:  The patient/guardian has verbally consented to: the potential risks and benefits of telemedicine (video visit) versus in person care; bill my insurance or make self-payment for services provided; and responsibility for payment of non-covered services.     Mode of Communication:  Video Conference via [employer]    As the provider I attest to compliance with applicable laws and regulations related to telemedicine.    Session Start Time: 11:00  Session End Time: 11:16     Session Length: 11:16 minutes    Session #: 2     Attendees: Client attended alone     Intervention: reviewed strategies for managing depression symptoms; motivational interviewing: explored potential barriers for making healthy changes    Identifying Information:  Client is a 24 year old, [place]ian, single female. Client was referred for a diagnostic assessment by PCP. The purpose of this evaluation is to: provide treatment recommendations and clarify diagnosis.  Client is currently employed full time and reports she is able to function appropriately at work.. Client attended the session alone.       Client's Statement of Presenting Concern:  Client reported seeking services at this time for diagnostic assessment and recommendations for treatment. Client's presenting concerns include: She has a short attention span. She can spend impulsively. She has difficulty focusing and daydreams. She is easily distracted. She feels unable to complete tasks at home. She bounces back and forth between tasks. She will start laundry and then she  feels overwhelmed and she loses motivation and decides to take a break and lay down. She is organized. She does not misplace or lose things. Client is usually rushing to get to work on time. Client procrastinates a lot and waits until the last minute to do things. She has a good memory and does not rely heavily on lists or calendars. She can pay attention when people are talking. Sometimes, when she goes into a meeting at work, she will be scrolling on her phone or her mind will wander. She has to ask people to repeat themselves often. She does not talk excessively or interrupt people. She can spend impulsively (e.g., she went to [place] and bought a $2000 bag). She will also order things online and these things add up. Client can't sit through a movie. She has to be on her phone at the same time. She recently started getting back into reading. She has to use an audio book simultaneously to be able to follow along with reading the book. She is fidgety and will bounce her leg up and down.     Client stated that symptoms have resulted in the following functional impairments: management of the household and or completion of tasks and work / vocational responsibilities.            History of Presenting Concern:  Client reported that she has not completed a previous ADHD diagnostic assessment. Client has not received a previous diagnosis of ADHD. Client reported that medication has not been prescribed medication to address these problems. Client reported that these problems began in college. The curriculum was harder and the expectations were higher and her mother was also going through cancer at the time and this was quite stressful for Client. Client has not attempted to resolve these concerns in the past. Client reported that other professional(s) are involved in providing support / services. Client is prescribed Prozac by PCP. She participates in therapy with someone through a private practice (virtual sessions).  They have been working together since June 2024. This has been helpful.       Social History:  As a child, client reported that she did not have any symptoms of ADHD in childhood. Client reported no difficulty with childhood peer relationships.  As a child, client reported having regular and consistent sleep patterns.  Client reported currently experiencing sleep disturbance, including: insomnia. She has trouble falling asleep and staying asleep. She will wake up during the night and it is hard to fall back to sleep. She uses melatonin to help with sleep.  Client reported sleeping approximately 6-7 hours per night.  Client reported that she has not completed a sleep study.  Client reported having a well balanced diet.  There are not significant nutritional concerns.  Client reported sporadic exercise patterns.      Client's highest education level was college graduate. Client graduated high school in 2019 with a 3.8 GPA. She estimated she obtained mostly As. During the elementary, middle, and high school years, patient recalls academic strengths in the area of math. Client reported experiencing academic problems in science. Client did identify the following learning problems: reading.  She didn't like reading. It couldn't hold her attention. She didn't have tutoring or accommodations. Client did not receive tutoring services during the school years. Client did not receive special education services. Client reported no particular problems during the school years. Client was independent with managing homework and meeting deadlines. She did well with academics. She did not have attendance issues. She did not have difficulty with paying attention in class. She was engaged during class. She was not disruptive or talkative. She was once suspended for a fight. Client did attend post-secondary school.  She graduated from college in 2023 from [employer] with a degree in psychology. Client reported that she did well  in college (3.3 GPA) and obtained mostly As and Bs. Client reported that she had more difficulty in college because her mother was going through cancer during that time. She was still meeting deadlines, but she was doing this more at the last minute. During college, it was harder to pay attention (this was all remote because of the COVID-19 pandemic). She wasn't really listening to video lectures. It was difficult to follow along and maintain interest.      Client reported that she is currently employed full-time. 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 She has been at this job for 2 years. Client reported that the current job is a good fit for her skills and personality.  Client reported that she frequently made mistakes with poor attention to detail. She has made mistakes or might miss things. She was reported at work for not getting documentation done on time. She put this off because it had been a long time since she had done it, but she forgot how to do it and wasn't able to ask for help. Client was embarrassed and didn't want to ask for help because she had been there for 2 years and wasn't sure how to do everything.  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  The longest period of employment has been 2 years.  Client has not been terminated from a place of employment.         Risk Taking Behaviors:  Client reported no history of risk taking behaviors      Motor Vehicle Operation:  Client has received a 's license.  Client has not received any moving violations.  Client reported the following driving habits: attentive and cautious.  According to client, other people are comfortable riding as a passenger when she is driving.        Mental Status Assessment:  Appearance:   Appropriate   Eye Contact:   Good   Psychomotor Behavior: Normal   Attitude:   Cooperative   Orientation:   All  Speech   Rate / Production: Normal    Volume:  Normal    Mood:    Normal  Affect:    Appropriate   Thought Content:  Clear   Thought Form:  Coherent  Logical   Insight:    Good       Review of Symptoms:  Depression:     Lack of interest or pleasure in doing things, Change in energy level, Change in sleep, and Difficulties concentrating  Kenzie:             No Symptoms  Psychosis:       No Symptoms  Anxiety:           Poor concentration and trouble relaxing  Panic:              No symptoms  Post Traumatic Stress Disorder:  No Symptoms   Eating Disorder:          No Symptoms  ADD / ADHD:              Inattentive, Difficulties listening, Poor task completion, Distractibility, Impulsive, and Restlessness/fidgety  Conduct Disorder:       No symptoms  Autism Spectrum Disorder:     No symptoms  Obsessive Compulsive Disorder:       No Symptoms  Personality Disorders:   No Symptoms  Reckless Behavior: No symptoms        Safety Issues and Plan for Safety and Risk Management:  Client denies a history of suicidal ideation, suicide attempts, self-injurious behavior, homicidal ideation, homicidal behavior, and and other safety concerns    Client denies current fears or concerns for personal safety.  Client denies current or recent suicidal ideation or behaviors.  Client denies current or recent homicidal ideation or behaviors.  Client denies current or recent self injurious behavior or ideation.  Client denies other safety concerns.  Client reports there are no firearms in the house.  Recommended that patient call 911 or go to the local ED should there be a change in any of these risk factors        Diagnostic Criteria:      Major Depressive Disorder   - Diminished interest or pleasure in all, or almost all, activities.    - Decreased sleep.    - Fatigue or loss of energy.    - Diminished ability to think or concentrate, or indecisiveness.      (1) Inattention: 6 or more of the following symptoms have persisted for at least 6 months to a degree that is inconsistent with developmental  level and that negatively impacts directly on social and academic/occupational activities:  - Often fails to give close attention to details or makes careless mistakes in schoolwork, at work, or during other activities  - Often has difficulty sustaining attention in tasks or play activities  - Often does not seem to listen when spoken to directly  - Often does not follow through on instructions and fails to finish schoolwork, chores, or duties in the workplace  - Often avoids, dislikes, or is reluctant to engage in tasks that require sustained mental effort  - Is often easily distractedby extraneous stimuli  - Often fidgets with or taps hands or feet or squirms in seat  - Often unable to play or engage in leisure activities quietly    Functional Status:  Client's symptoms have caused reduced functional status in the following areas: management of the household and or completion of tasks and work / vocational responsibilities.            DSM-5 Diagnoses: (Sustained by DSM5 Criteria Listed Above)     296.31 (F33.0) Major Depressive Disorder, Recurrent Episode, Mild   RULE OUT: ADHD    Attendance Agreement:  Client has signed Attendance Agreement:No: unable to sign via telehealth      Preliminary Plan:  The client reports no currently identified Moravian, ethnic or cultural issues relevant to therapy.     services are not indicated.    Modifications to assist communication are not indicated.    Collaboration / coordination of treatment will be initiated with the following support professionals: primary care physician and outpatient therapist.    Referral to another professional/service is not indicated at this time..    A Release of Information has been obtained for the following: TBD if FRANKIE for therapist is needed.    Client was given self and collaborative rating scales to be completed prior to the next appointment.  Client consented to sending/receiving these measures via email.   Depression and anxiety  rating scales were completed.  A third appointment was not scheduled at this time.     Report to child / adult protection services was NA.    Patient will have open access to their mental health medical record.    Marlen Zheng, PhD, LP  February 20, 2025

## 2025-03-10 ENCOUNTER — DOCUMENTATION ONLY (OUTPATIENT)
Dept: PSYCHOLOGY | Facility: CLINIC | Age: 25
End: 2025-03-10
Payer: COMMERCIAL

## 2025-03-10 NOTE — PROGRESS NOTES
"Name: Joni Garcia  MRN:?[id number]  :?2000              Ethel Adult ADHD Rating Scale-IV: Self and Other Reports (BAARS-IV)   The BAARS-IV assesses for symptoms of ADHD that are experienced in one's daily life. This assessment measure includes self and collateral rating scales designed to provide information regarding current and childhood symptoms of ADHD including inattention, hyperactivity, and impulsivity. Self-report scores are reported as percentiles. Scores at the 76th-83rd percentile are considered marginal, scores at the 84th-92nd percentile are considered borderline, scores at the 93rd-95th percentile are considered mild, scores at the 96th-98th percentile are considered moderate, and those at the 99th percentile are considered severe. Collateral or \"other\" rating scales are reported as number of symptoms observed in comparison to those reported by the client. Norms and percentile scores are not available for collateral reports.    ?   Current Symptoms Scale--Self Report:    Client completed the self-report inventory of current symptoms. The results indicate that the client's Total ADHD Score was 29 which places them in the 51-75th percentile for overall ADHD symptoms. In addition, the client endorsed the following occur \"often\" or \"very often\": 2/9 (89th percentile) Inattention symptoms, 1/9 (80th percentile) Hyperactivity/Impulsivity symptoms, and 3/9 (88th percentile) Sluggish Cognitive Tempo symptoms. Overall, the results suggest the client is reporting borderline symptoms of inattention at this time.    ?   Current Symptoms Scale--Other Report:   Client's friend completed the collateral report inventory of current symptoms. Based on the collateral contact's observation of symptoms, the client demonstrates the following \"often\" or \"very often\": 7/9 Inattention symptoms, 1/5 Hyperactivity symptoms, 3/4 Impulsivity symptoms, and 9/9 Sluggish Cognitive Tempo symptoms. The collateral report's " "Total ADHD Score was 49. The collateral- and self-report scores are discrepant. Her friend noted more symptoms of inattention and hyperactivity/impulsivity at this time.     Childhood Symptoms Scale--Self-Report:   Client completed the self-report inventory of childhood symptoms. The results indicate that the client's Total ADHD Score was 19 which places them in the 1-50th percentile for overall ADHD symptoms in childhood. In addition, the client endorsed having experienced the following \"often\" or \"very often\": 0/9 (1-75th percentile) Inattention symptoms and 0/9 (1-75th percentile) Hyperactivity-Impulsivity symptoms. Overall, the results suggest the client did not experience symptoms of inattention or hyperactivity/impulsivity in childhood.     Childhood Symptoms Scale--Other Report:   Client's friend completed the collateral report inventory of childhood symptoms. Based on the collateral contact's recollection of client's childhood symptoms, the client demonstrated the following \"often\" or \"very often\": 6/9 Inattention symptoms and 4/9 Hyperactivity-Impulsivity symptoms. The client's Total ADHD Score was 48. The collateral-report and self-report scores are discrepant. Client's friend noted more symptoms of inattention and hyperactivity/impulsivity in childhood.    Ethel Functional Impairment Scale: Self and Other Reports (BFIS)   The BFIS is used to assess an individuals' psychosocial impairment in major life/daily activities that may be due to a mental health disorder. This assessment measure includes self and collateral rating scales. Self-report scores are reported as percentiles. Scores at the 76th-83rd percentile are considered marginal, scores at the 84th-92nd percentile are considered borderline, scores at the 93rd-95th percentile are considered mild, scores at the 96th-98th percentile are considered moderate, and those at the 99th percentile are considered severe. Collateral or \"other\" rating scales are " "reported as number of symptoms observed in comparison to those reported by the client. Norms and percentile scores are not available for collateral reports.    ?   Results indicate the client did not identify impairment (scores at or greater than 93rd percentile) in any areas. Client's Mean Impairment Score was 0.26 (1-50th percentile) indicating the client is not reporting impairment in functioning across domains. Client's friend completed the collateral report for this measure which demonstrated discrepant results. Her scores were generally higher (e.g., mean impairment score of 6.5). She noted impairment in the areas of: home-family, home-chores, social-strangers, social-friends, dating/marriage, money management, driving, sexual relations and daily responsibilities.    Ethel Deficits in Executive Functioning Scale (BDEFS)   The BDEFS is a measure used for evaluating dimensions of adult executive functioning in daily life. This assessment measure includes self and collateral rating scales. Self-report scores are reported as percentiles. Scores at the 76th-83rd percentile are considered marginal, scores at the 84th-92nd percentile are considered borderline, scores at the 93rd-95th percentile are considered mild, scores at the 96th-98th percentile are considered moderate, and those at the 99th percentile are considered severe. Collateral or \"other\" rating scales are reported as number of symptoms observed in comparison to those reported by the client. Norms and percentile scores are not available for collateral reports.    ?   Results indicate the client's Total Executive Functioning Score was 158 (51-75th percentile). The ADHD-Executive Functioning Index score was 19 (51-75th percentile). These scores suggest the client is not reporting deficits in executive functioning. Client's friend completed the collateral report which demonstrated discrepant results. Her scores were considerably higher. She noted deficits " in the areas of: self-organization/problem-solving, self-restraint, and self-motivation.     Generalized Anxiety Disorder Questionnaire (LILLY-7)   This questionnaire is designed to screen for anxiety in adults. Based on the client's score of 2, they are not reporting symptoms of anxiety at this time.     Patient Health Questionnaire- 9 (PHQ-9)    This questionnaire is designed to screen for depression in adults. Based on the client's score of 8, they are reporting mild symptoms of depression at this time. Client endorsed the following symptoms of depression: little interest or pleasure in doing things; feeling tired or having little energy; and poor concentration.

## 2025-03-27 ENCOUNTER — PSYCHE (OUTPATIENT)
Dept: PSYCHOLOGY | Facility: CLINIC | Age: 25
End: 2025-03-27
Payer: COMMERCIAL

## 2025-03-27 DIAGNOSIS — F33.0 MAJOR DEPRESSIVE DISORDER, RECURRENT EPISODE, MILD: Primary | ICD-10-CM

## 2025-03-27 NOTE — PROGRESS NOTES
[facility]      PATIENT'S NAME: Joni Garcia    MRN: [id number]    [id number]    DATE OF SERVICE: 3/27/25    SERVICE MODALITY:  In-person     Date(s) of assessment: 3/27/2025    WAIS and Trails A & B       Wechsler Adult Intelligence Scale--Fourth Edition (WAIS-IV)  Patient s intellectual functioning was assessed using the WAIS-IV. This measure provides a Full Scale Score, as well as several index scores measuring specific areas of cognitive ability. Index scores are reported using standard scores which have a mean of 100 and a standard deviation of 15. Subtest scores are reported using scaled scores that have a mean of 10 and a standard deviation of 2. Patient s scores are reported at the 95 percent confidence interval, which indicates that there is a 95 percent chance that his true score falls within the stated range.  Results indicate that the FSIQ, a measure of overall intelligence, is a reasonable estimate of the Patient s intellectual functioning . Client's FSIQ was in the Average Range (95% chance ; 39th percentile).     Patient's score on the Verbal Comprehension Index (VCI), a measure of verbal concept formation, verbal reasoning, and acquired knowledge, was in the Average range (95% chance ; 50th percentile). Patient s score on the Perceptual Reasoning Index (CURLY), a measure of perceptual and fluid reasoning, spatial processing, and visual-motor integration, was in the Average range (95% chance 86-99; 30th percentile). Patient scored in the Average range (95% chance ; 37th percentile) on the Working Memory Index (WMI), a measure of ability to retain information in memory, perform some operation or manipulation, and produce a result. Patient s score on the Processing Speed Index (PSI), a measure of short-term visual memory, attention, and visual-motor coordination was in the Average range (95% chance ; 50th percentile). There were no areas of  deficit or weakness that suggest attentional difficulties.     Summary of WAIS-IV Index Scores   Index  Score Percentile Rank Confidence  Interval* Qualitative Description   Verbal Comprehension Index (VCI) 100 50  Average   Perceptual Reasoning Index (CURLY) 92 30 86-99 Average   Working Memory Index (WMI) 95 37  Average   Processing Speed Index (PSI) 100 50  Average   Full Scale IQ (FSIQ) 96 39  Average     Summary of WAIS-IV Scaled Subtest Scores  Verbal Comprehension Perceptual Reasoning   Similarities 8 Block Design 7   Vocabulary 10 Matrix Reasoning 9   Information 12 Visual Puzzles 9   Working Memory Processing Speed   Digit Span 9 Coding 9   Arithmetic 9 Symbol Search 11   *Confidence intervals at 95th percentile        Trail Making Test:    On a processing speed test of complex concentration that requires speeded numeric sequencing (Trails A), the patients score was Average (average, above average etc) for completion time and without  error. On an executive functioning test that requires speeded alpha-numeric sequencing/cognitive set-shifting (Trails B), performance was Low Average without errors.        Seconds Scaled Score T-Score Percentile Performance Description   TRAILS A 28 10 43 25th Average   TRAILS B 67 10 42 21st Low Average

## 2025-03-31 ENCOUNTER — DOCUMENTATION ONLY (OUTPATIENT)
Dept: PSYCHOLOGY | Facility: CLINIC | Age: 25
End: 2025-03-31
Payer: COMMERCIAL

## 2025-03-31 NOTE — PROGRESS NOTES
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 ask for help. Client was embarrassed and didn't want to ask for help because she had been there for 2 years and wasn't sure how to do everything. 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 The longest period of employment has been 2 years. Client has not been terminated from a place of employment.     Results of testing were not suggestive of ADHD. On the rating scales, Client did not endorse symptoms of ADHD currently or in childhood. The collateral report (friend) was discrepant and noted symptoms of inattention currently and in childhood. Similarly, Client did not endorse impairment in functioning or deficits in executive functioning, but her friend did. Personality testing was positive for impulsivity and repetitive checking. Client's responses on self-report measures suggest mild symptoms of depression at this time. Client completed a brief virtual assessment examining brief core brain function domains. Results of this assessment demonstrated that, overall, Client's scores fell between the Low Average and Low ranges. Client performed in the Low Average range on the domains of Cognitive Flexibility and Executive Function. She performed in the Low range on the domains of Complex  Attention, Working Memory, and Sustained Attention. Errors are suggestive of confusion and/or impulsivity. Finally, results of cognitive testing did not demonstrate statistically significant discrepancies between her abilities. Her working memory and processing speed abilities were commensurate with her scores on verbal comprehension and perceptual reasoning. There were no areas of deficit or weakness that would be expected if one had ADHD. On an executive functioning test that requires speeded alpha-numeric sequencing/cognitive set-shifting (Trails [facility]), performance was Low Average without error.     Based on the results of clinical interview and psychological testing, the client currently meets criteria for diagnoses of Major Depressive Disorder, Recurrent Episode, Mild and Cannabis Use Disorder, Mild. Client will be provided with the results of testing, diagnosis, and recommendations in her last appointment.     DSM-5 Diagnoses: (Sustained by DSM5 Criteria Listed Above)     Major Depressive Disorder, Recurrent Episode, Mild (F33.0)    Cannabis Use Disorder, Mild (F12.10)     Psychosocial & Contextual Factors: working full-time     Recommendations:     1. Schedule a medication evaluation with your physician. Medications are often beneficial in treating depression symptoms.  ??? ???   2. Individual therapy is recommended. Therapies focused on identifying and challenging problematic thought and behavior patterns while increasing the use of healthy coping skills has been found to be effective in treating anxiety. It will be important to set goals in this therapy and work actively toward achieving short-term successes that lead to the completion of each goal. Action-oriented therapies, such as CBT and ACT (Acceptance and Commitment Therapy) are particularly recommended for the treatment of chronic anxiety.    ??   3. It is recommended to abstain from using cannabis as use can negatively impact cognitive functioning,  attention, concentration, and executive functioning. [facility] can be utilized by calling [phone].  ?   4. ?The following compensatory strategies may be useful to cope with reported inattention symptoms:??   a. Maintaining a predictable routine and structured environment that incorporates prioritized checklists and reminders (e.g., Post-Its).?   b. When completing tasks, try to focus on one task at a time and complete it in its entirety before moving on to the next task.?   c. Minimize background distractions when working on complex tasks. For example, TV, radio or ongoing conversations in the background may hinder ability to focus on the task at hand.?   d. Take regular breaks from tasks that require prolonged attention. In general, regular breaks from complex tasks can help prevent lapses in attention, which can result in errors.?   e. Outline the steps required to complete a task prior to beginning it, which can help ensure an organized approach. Use the outline to refer to throughout the task as a reminder of the steps to be completed.?   ?   Marlen Zheng, PhD, LP?   Licensed Psychologist?

## 2025-04-03 ENCOUNTER — VIRTUAL VISIT (OUTPATIENT)
Dept: PSYCHOLOGY | Facility: CLINIC | Age: 25
End: 2025-04-03
Payer: COMMERCIAL

## 2025-04-03 DIAGNOSIS — F12.10 CANNABIS ABUSE, CONTINUOUS: ICD-10-CM

## 2025-04-03 DIAGNOSIS — F33.0 MAJOR DEPRESSIVE DISORDER, RECURRENT EPISODE, MILD: Primary | ICD-10-CM

## 2025-04-03 NOTE — PROGRESS NOTES
Client Name:  Joni Garcia   Date: 4/3/25      Service Type: Individual (ADHD Evaluation feedback session)   ?   Session Start Time:  1:54   Session End Time: 2:09  ?   Session Length: 15 minutes    ?   Session #: (feedback)   ?   Attendees: Client attended alone (in person at [facility])    Telemedicine Visit: The patient's condition can be safely assessed and treated via synchronous audio and visual telemedicine encounter.      Reason for Telemedicine Visit: Services only offered telehealth    Originating Site (Patient Location): Patient's home      Distant Location (provider location):  Off-site    Consent:  The patient/guardian has verbally consented to: the potential risks and benefits of telemedicine (video visit) versus in person care; bill my insurance or make self-payment for services provided; and responsibility for payment of non-covered services.     Mode of Communication:  Video Conference via Du[employer] Chinese    As the provider I attest to compliance with applicable laws and regulations related to telemedicine.    ?   DATA   ?   ?   Treatment Objective(s) Addressed in This Session:    Provided feedback on ADHD evaluation. Reviewed test results in depth and answered client's questions. Client is diagnosed with Major Depressive Disorder, Recurrent Episode, Mild and Cannabis Use Disorder, Mild. This provider also completed full written report of evaluation, including integration of testing data, summary, and recommendations. Please see Documentation Only dated 3/31/25.  ?   Progress on / Status of Treatment Objective(s) / Homework:    Completed    ?   Intervention:   ADHD Evaluation feedback; Reviewed report (can be found in Documentation Only encounter dated 3/31/25); Client was appreciative of the feedback and expressed understanding of the diagnoses.     ?   ASSESSMENT: Current Emotional / Mental Status (status of significant symptoms):   Risk status (Self / Other harm or suicidal ideation)    Client denies current fears or concerns for personal safety.   Client denies current or recent suicidal ideation or behaviors.   Client denies current or recent homicidal ideation or behaviors.   Client denies current or recent self-injurious behavior or ideation.   Client denies other safety concerns.   A safety and risk management plan has not been developed at this time, however client was given the after-hours number / 911 should there be a change in any of these risk factors.   ?   Appearance: Appropriate  Eye Contact: Good   Psychomotor Behavior: Normal   Attitude: Cooperative    Orientation: All   Speech   Rate / Production: Normal    Volume: Normal    Mood: Normal   Affect: Appropriate    Thought Content: Clear    Thought Form: Coherent Logical    Insight: Good    ?   Medication Review:   Client is prescribed Prozac by PCP.    Medication Compliance:  Yes  ?   Changes in Health Issues:   None reported.   ?   Chemical Use Review:   Substance Use: Chemical use reviewed, recommended to abstain from cannabis    Tobacco Use: No current tobacco use.    ?   Collateral Reports Completed:   Routed note to Care Team Member(s)   ?   PLAN: (Homework, other)   ?   Recommendations:       1. Schedule a medication evaluation with your physician. Medications are often beneficial in treating depression symptoms.  ??? ???   2. Individual therapy is recommended. Therapies focused on identifying and challenging problematic thought and behavior patterns while increasing the use of healthy coping skills has been found to be effective in treating anxiety. It will be important to set goals in this therapy and work actively toward achieving short-term successes that lead to the completion of each goal. Action-oriented therapies, such as CBT and ACT (Acceptance and Commitment Therapy) are particularly recommended for the treatment of chronic anxiety.    ??   3. It is recommended to abstain from using cannabis as use can negatively  impact cognitive functioning, attention, concentration, and executive functioning. [facility] can be utilized by calling [phone].  ?   4. ?The following compensatory strategies may be useful to cope with reported inattention symptoms:??   a. Maintaining a predictable routine and structured environment that incorporates prioritized checklists and reminders (e.g., Post-Its).?   b. When completing tasks, try to focus on one task at a time and complete it in its entirety before moving on to the next task.?   c. Minimize background distractions when working on complex tasks. For example, TV, radio or ongoing conversations in the background may hinder ability to focus on the task at hand.?   d. Take regular breaks from tasks that require prolonged attention. In general, regular breaks from complex tasks can help prevent lapses in attention, which can result in errors.?   e. Outline the steps required to complete a task prior to beginning it, which can help ensure an organized approach. Use the outline to refer to throughout the task as a reminder of the steps to be completed.?   ?   Marlen Zheng, PhD, LP?   Licensed Psychologist?      Psychological Testing   Billing/Services Summary       Testing Evaluation Services Base: 20315  (1st 60 mins) Add-on: 66327  (each addtl 60 mins)   Record Review and Clarify Referral Question   (7:20/7:40), (2/12/25) 20 minutes   Integration/Report Generation   (8:00/9:00), (2/19/25) - MMPI-3  (12:00/1:00), (3/10/25) - Ethel Scales  (8:00/9:00), (3/21/25) - CNS Vital Signs  (7:00/8:00), (3/31/25) - Report Writing 60 minutes  60 minutes  60 minutes  60 minutes   Interactive Feedback Session  (1:54/2:09), (4/3/25) 15 minutes    Total Time: 275 minutes (4 hours, 35 minutes)   Total Units: 1 4       Test Administration and Scoring Base: 10938  (1st 30 mins) Add-on: 40829  (each addtl 30 mins)   Test Administration (Face-to-Face)  (11:00/12:30), (3/27/25)  90 minutes    Scoring (Non-Face-to-Face)   (12:30/1:00), (3/27/25) 30 minutes   Total Time: 120 minutes (2 hours, 0 minutes)   Total Units: 1 3       Diagnosis(es): (ICD-10)  Major Depressive Disorder, Recurrent Episode, Mild (F33.0)  Cannabis Use Disorder, Mild (F12.10)

## 2025-04-10 ENCOUNTER — PATIENT OUTREACH (OUTPATIENT)
Dept: MIDWIFE SERVICES | Facility: CLINIC | Age: 25
End: 2025-04-10
Payer: COMMERCIAL

## 2025-04-10 DIAGNOSIS — R87.612 PAPANICOLAOU SMEAR OF CERVIX WITH LOW GRADE SQUAMOUS INTRAEPITHELIAL LESION (LGSIL): Primary | ICD-10-CM

## 2025-04-16 DIAGNOSIS — F32.1 CURRENT MODERATE EPISODE OF MAJOR DEPRESSIVE DISORDER WITHOUT PRIOR EPISODE (H): ICD-10-CM

## 2025-04-17 RX ORDER — FLUOXETINE 10 MG/1
10 CAPSULE ORAL DAILY
Qty: 60 CAPSULE | Refills: 0 | Status: SHIPPED | OUTPATIENT
Start: 2025-04-17

## 2025-04-17 NOTE — TELEPHONE ENCOUNTER
Requested Prescriptions   Pending Prescriptions Disp Refills    FLUoxetine (PROZAC) 10 MG capsule [Pharmacy Med Name: FLUOXETINE HCL 10MG CAPS] 60 capsule 0     Sig: TAKE ONE CAPSULE BY MOUTH ONCE DAILY       SSRIs Protocol Failed - 4/17/2025  9:46 AM        Failed - PHQ-9 score less than 5 in past 6 months     Please review last PHQ-9 score.       12/2/2024     1:33 PM 2/7/2025    12:31 PM 2/12/2025    12:39 PM   PHQ-9 SCORE   PHQ-9 Total Score MyChart  7 (Mild depression) 7 (Mild depression)   PHQ-9 Total Score 7 7  7        Patient-reported             Passed - Medication is active on med list and the sig matches. RN to manually verify dose and sig if red X/fail.     If the protocol passes (green check), you do not need to verify med dose and sig.    A prescription matches if they are the same clinical intention.    For Example: once daily and every morning are the same.    The protocol can not identify upper and lower case letters as matching and will fail.     For Example: Take 1 tablet (50 mg) by mouth daily     TAKE 1 TABLET (50 MG) BY MOUTH DAILY    For all fails (red x), verify dose and sig.    If the refill does match what is on file, the RN can still proceed to approve the refill request.       If they do not match, route to the appropriate provider.             Passed - Recent (12 mo) or future (90 days) visit within the authorizing provider's specialty     The patient must have completed an in-person or virtual visit within the past 12 months or has a future visit scheduled within the next 90 days with the authorizing provider s specialty.  Urgent care and e-visits do not qualify as an office visit for this protocol.          Passed - Medication indicated for associated diagnosis     Medication is associated with one or more of the following diagnoses:              Anxiety             Bipolar  Depression  Obsessive-compulsive disorder             Panic disorder  Postmenopausal flushing              Premenstrual dysphoric disorder             Social phobia   Adjustment disorder with depressed mood   Mood disorder   Adjustment disorder with anxious mood          Passed - Patient is age 18 or older        Passed - No active pregnancy on record        Passed - No positive pregnancy test in last 12 months           Last Written Prescription Date:  2/7/25  Last Fill Quantity: 60,  # refills: 0   Last office visit: 4/23/2024 ; last virtual visit: 12/2/2024 with prescribing provider:  Jayson Ayala CNM   Future Office Visit:    None    Pt completed questionnaires and will follow up with FP for possible adjustment to motivation increase  Eva William RN on 4/17/2025 at 10:44 AM

## 2025-06-08 ENCOUNTER — HEALTH MAINTENANCE LETTER (OUTPATIENT)
Age: 25
End: 2025-06-08

## 2025-06-11 ENCOUNTER — OFFICE VISIT (OUTPATIENT)
Dept: MIDWIFE SERVICES | Facility: CLINIC | Age: 25
End: 2025-06-11
Payer: COMMERCIAL

## 2025-06-11 ENCOUNTER — RESULTS FOLLOW-UP (OUTPATIENT)
Dept: MIDWIFE SERVICES | Facility: CLINIC | Age: 25
End: 2025-06-11

## 2025-06-11 VITALS
HEIGHT: 63 IN | DIASTOLIC BLOOD PRESSURE: 54 MMHG | OXYGEN SATURATION: 100 % | HEART RATE: 75 BPM | BODY MASS INDEX: 24.98 KG/M2 | SYSTOLIC BLOOD PRESSURE: 103 MMHG | WEIGHT: 141 LBS

## 2025-06-11 DIAGNOSIS — Z23 NEED FOR HPV VACCINE: ICD-10-CM

## 2025-06-11 DIAGNOSIS — Z11.3 SCREEN FOR STD (SEXUALLY TRANSMITTED DISEASE): ICD-10-CM

## 2025-06-11 DIAGNOSIS — Z13.0 SCREENING FOR DEFICIENCY ANEMIA: ICD-10-CM

## 2025-06-11 DIAGNOSIS — Z01.419 WELL WOMAN EXAM WITH ROUTINE GYNECOLOGICAL EXAM: Primary | ICD-10-CM

## 2025-06-11 DIAGNOSIS — Z11.59 NEED FOR HEPATITIS B SCREENING TEST: ICD-10-CM

## 2025-06-11 DIAGNOSIS — Z13.21 ENCOUNTER FOR VITAMIN DEFICIENCY SCREENING: ICD-10-CM

## 2025-06-11 DIAGNOSIS — N87.0 DYSPLASIA OF CERVIX, LOW GRADE (CIN 1): ICD-10-CM

## 2025-06-11 DIAGNOSIS — F32.1 CURRENT MODERATE EPISODE OF MAJOR DEPRESSIVE DISORDER WITHOUT PRIOR EPISODE (H): ICD-10-CM

## 2025-06-11 DIAGNOSIS — Z11.8 SPECIAL SCREENING EXAMINATION FOR CHLAMYDIAL DISEASE: ICD-10-CM

## 2025-06-11 DIAGNOSIS — Z11.59 NEED FOR HEPATITIS C SCREENING TEST: ICD-10-CM

## 2025-06-11 DIAGNOSIS — Z11.3 SCREENING FOR GONORRHEA: ICD-10-CM

## 2025-06-11 DIAGNOSIS — Z11.4 SCREENING FOR HIV (HUMAN IMMUNODEFICIENCY VIRUS): ICD-10-CM

## 2025-06-11 DIAGNOSIS — Z13.220 SCREENING FOR CHOLESTEROL LEVEL: ICD-10-CM

## 2025-06-11 LAB
CHOLEST SERPL-MCNC: 240 MG/DL
FASTING STATUS PATIENT QL REPORTED: NO
HBV SURFACE AG SERPL QL IA: NONREACTIVE
HCV AB SERPL QL IA: NONREACTIVE
HDLC SERPL-MCNC: 84 MG/DL
HGB BLD-MCNC: 12.8 G/DL (ref 11.7–15.7)
HIV 1+2 AB+HIV1 P24 AG SERPL QL IA: NONREACTIVE
LDLC SERPL CALC-MCNC: 140 MG/DL
MCV RBC AUTO: 87 FL (ref 78–100)
NONHDLC SERPL-MCNC: 156 MG/DL
TRIGL SERPL-MCNC: 78 MG/DL
VIT D+METAB SERPL-MCNC: 23 NG/ML (ref 20–50)

## 2025-06-11 PROCEDURE — 87340 HEPATITIS B SURFACE AG IA: CPT | Performed by: ADVANCED PRACTICE MIDWIFE

## 2025-06-11 PROCEDURE — 87491 CHLMYD TRACH DNA AMP PROBE: CPT | Performed by: ADVANCED PRACTICE MIDWIFE

## 2025-06-11 PROCEDURE — 3074F SYST BP LT 130 MM HG: CPT | Performed by: ADVANCED PRACTICE MIDWIFE

## 2025-06-11 PROCEDURE — 36415 COLL VENOUS BLD VENIPUNCTURE: CPT | Performed by: ADVANCED PRACTICE MIDWIFE

## 2025-06-11 PROCEDURE — 99395 PREV VISIT EST AGE 18-39: CPT | Mod: 25 | Performed by: ADVANCED PRACTICE MIDWIFE

## 2025-06-11 PROCEDURE — 99459 PELVIC EXAMINATION: CPT | Performed by: ADVANCED PRACTICE MIDWIFE

## 2025-06-11 PROCEDURE — 3078F DIAST BP <80 MM HG: CPT | Performed by: ADVANCED PRACTICE MIDWIFE

## 2025-06-11 PROCEDURE — 86803 HEPATITIS C AB TEST: CPT | Performed by: ADVANCED PRACTICE MIDWIFE

## 2025-06-11 PROCEDURE — 87591 N.GONORRHOEAE DNA AMP PROB: CPT | Performed by: ADVANCED PRACTICE MIDWIFE

## 2025-06-11 PROCEDURE — 87389 HIV-1 AG W/HIV-1&-2 AB AG IA: CPT | Performed by: ADVANCED PRACTICE MIDWIFE

## 2025-06-11 PROCEDURE — 85018 HEMOGLOBIN: CPT | Performed by: ADVANCED PRACTICE MIDWIFE

## 2025-06-11 PROCEDURE — 82306 VITAMIN D 25 HYDROXY: CPT | Performed by: ADVANCED PRACTICE MIDWIFE

## 2025-06-11 PROCEDURE — 90651 9VHPV VACCINE 2/3 DOSE IM: CPT | Performed by: ADVANCED PRACTICE MIDWIFE

## 2025-06-11 PROCEDURE — 80061 LIPID PANEL: CPT | Performed by: ADVANCED PRACTICE MIDWIFE

## 2025-06-11 PROCEDURE — 90471 IMMUNIZATION ADMIN: CPT | Performed by: ADVANCED PRACTICE MIDWIFE

## 2025-06-11 PROCEDURE — 86780 TREPONEMA PALLIDUM: CPT | Performed by: ADVANCED PRACTICE MIDWIFE

## 2025-06-11 RX ORDER — FLUOXETINE 10 MG/1
10 CAPSULE ORAL DAILY
Qty: 90 CAPSULE | Refills: 3 | Status: SHIPPED | OUTPATIENT
Start: 2025-06-11

## 2025-06-11 NOTE — PROGRESS NOTES
"Joni is a 25 year old  female who presents for annual exam. She is doing well. No complaints today. Due for recheck PAP due to history of YONATHAN 1. Taking her prozac, and mood has been good; needs a refill. Would like full STI testing. Not currently on BC and not sexually active at this time.    Menses are regular q 28-30 days and normal lasting 5 days.  Menses flow: normal.  No LMP recorded.. Using none for contraception.  She is not currently considering pregnancy.  Besides routine health maintenance, she has no other health concerns today .  GYNECOLOGIC HISTORY:  Menarche:   Joni is not sexually active     History sexually transmitted infections:Chlamydia  STI testing offered?  Accepted  BRENNEN exposure: Unknown  History of abnormal Pap smear: YES - reflected in Problem List and Health Maintenance accordingly  Family history of breast CA: Yes (Please explain): mom  Family history of uterine/ovarian CA: No    Family history of colon CA: No    HEALTH MAINTENANCE:  Cholesterol: (No results found for: \"CHOL\" History of abnormal lipids: na  Mammo: na . History of abnormal Mammo: Not applicable.  Regular Self Breast Exams: No  Calcium/Vitamin D intake: source:  dairy Adequate? Yes  TSH: (  TSH   Date Value Ref Range Status   2019 1.33 0.30 - 5.00 uIU/mL Final    )  Pap; (No results found for: \"PAP\" )    HISTORY:  OB History    Para Term  AB Living   0 0 0 0 0 0   SAB IAB Ectopic Multiple Live Births   0 0 0 0 0     Past Medical History:   Diagnosis Date    Abnormal Pap smear of cervix 2022    Depression     Family history of sickle cell anemia      Past Surgical History:   Procedure Laterality Date    NO HISTORY OF SURGERY       Family History   Problem Relation Age of Onset    Breast Cancer Mother     Sickle Cell Anemia Mother     Sickle Cell Anemia Sister     Glaucoma No family hx of     Macular Degeneration No family hx of     Diabetes No family hx of      Social History "     Socioeconomic History    Marital status: Single   Tobacco Use    Smoking status: Never    Smokeless tobacco: Never   Vaping Use    Vaping status: Never Used   Substance and Sexual Activity    Alcohol use: No    Drug use: No    Sexual activity: Never   Social History Narrative    Dad- Alfonzo Wilhelm       Current Outpatient Medications:     betamethasone dipropionate (DIPROSONE) 0.05 % external lotion, Apply topically daily (Patient not taking: Reported on 12/2/2024), Disp: , Rfl:     doxycycline hyclate (VIBRAMYCIN) 100 MG capsule, Take 1 capsule (100 mg) by mouth 2 times daily, Disp: 14 capsule, Rfl: 0    ferrous gluconate (FERGON) 324 (38 Fe) MG tablet, Take 324 mg by mouth daily (Patient not taking: Reported on 12/2/2024), Disp: , Rfl:     fluocinolone acetonide (DERMA SMOOTHE/FS BODY) 0.01 % external oil, Apply topically daily (Patient not taking: Reported on 12/2/2024), Disp: , Rfl:     FLUoxetine (PROZAC) 10 MG capsule, TAKE ONE CAPSULE BY MOUTH ONCE DAILY, Disp: 60 capsule, Rfl: 0    isoniazid (NYDRAZID) 300 MG tablet, Take 1 tablet (300 mg) by mouth daily (Patient not taking: Reported on 12/2/2024), Disp: 30 tablet, Rfl: 1    ketoconazole (NIZORAL) 2 % external shampoo, Apply topically daily as needed for itching (Patient not taking: Reported on 12/2/2024), Disp: , Rfl:     methocarbamol (ROBAXIN) 500 MG tablet, Take 1 tablet (500 mg) by mouth 4 times daily as needed (Patient not taking: Reported on 12/2/2024), Disp: 10 tablet, Rfl: 0    minoxidil (LONITEN) 2.5 MG tablet, Take 2.5 mg by mouth daily as directed (Patient not taking: Reported on 12/2/2024), Disp: , Rfl:     rifampin (RIFADIN) 300 MG capsule, Take 2 capsules (600 mg) by mouth daily (Patient not taking: Reported on 12/2/2024), Disp: 60 capsule, Rfl: 1    VITAMIN D3 50 MCG (2000 UT) tablet, Take 2 tablets by mouth daily at 2 pm, Disp: , Rfl:    No Known Allergies    Past medical, surgical, social and family history were reviewed  and updated in EPIC.    ROS:   C:     NEGATIVE for fever, chills, change in weight  I:       NEGATIVE for worrisome rashes, moles or lesions  E:     NEGATIVE for vision changes or irritation  E/M: NEGATIVE for ear, mouth and throat problems  R:     NEGATIVE for significant cough or SOB  CV:   NEGATIVE for chest pain, palpitations or peripheral edema  GI:     NEGATIVE for nausea, abdominal pain, heartburn, or change in bowel habits  :   NEGATIVE for frequency, dysuria, hematuria, vaginal discharge, or irregular bleeding  M:     NEGATIVE for significant arthralgias or myalgia  N:      NEGATIVE for weakness, dizziness or paresthesias  E:      NEGATIVE for temperature intolerance, skin/hair changes  P:      NEGATIVE for changes in mood or affect.    EXAM:  There were no vitals taken for this visit.   BMI: There is no height or weight on file to calculate BMI.  Constitutional: healthy, alert and no distress  Head: Normocephalic. No masses, lesions, tenderness or abnormalities  Neck: Neck supple. Trachea midline. No adenopathy. Thyroid symmetric, normal size.   Cardiovascular: RRR.   Respiratory: Negative.   Breast: No nodularity, asymmetry or nipple discharge bilaterally.  Gastrointestinal: Abdomen soft, non-tender, non-distended. No masses, organomegaly.  :  Vulva:  No external lesions, normal female hair distribution, no inguinal adenopathy.    Urethra:  Midline, non-tender, well supported, no discharge  Vagina:  Moist, pink, no abnormal discharge, no lesions  Musculoskeletal: extremities normal  Skin: no suspicious lesions or rashes  Psychiatric: Affect appropriate, cooperative,mentation appears normal.     COUNSELING:   Reviewed preventive health counseling, as reflected in patient instructions   reports that she has never smoked. She has never used smokeless tobacco.    There is no height or weight on file to calculate BMI.    FRAX Risk Assessment    ASSESSMENT:  25 year old female with satisfactory annual  exam  (Z01.419) Well woman exam with routine gynecological exam  (primary encounter diagnosis)    (Z23) Need for HPV vaccine  Plan: HPV 9Y+ (Gardasil 9)    (N87.0) Dysplasia of cervix, low grade (YONATHAN 1)  Plan: Pap Diagnostic Reflex to HPV if ASCUS    (Z11.3) Screen for STD (sexually transmitted disease)  Plan: Treponema Abs w Reflex to RPR and Titer,         Hepatitis C antibody, HIV Antigen Antibody         Combo Cascade, Hepatitis B surface antigen,         Chlamydia trachomatis/Neisseria gonorrhoeae by         PCR - lab collect    (Z11.4) Screening for HIV (human immunodeficiency virus)  Plan: HIV Antigen Antibody Combo Cascade    (Z11.59) Need for hepatitis C screening test  Plan: Hepatitis C antibody    (Z11.59) Need for hepatitis B screening test  Plan: Hepatitis B surface antigen    (Z11.3) Screening for gonorrhea  Plan: Chlamydia trachomatis/Neisseria gonorrhoeae by         PCR - lab collect    (Z11.8) Special screening examination for chlamydial disease  Plan: Chlamydia trachomatis/Neisseria gonorrhoeae by         PCR - lab collect    (Z13.21) Encounter for vitamin deficiency screening  Plan: Vitamin D Deficiency    (Z13.0) Screening for deficiency anemia  Plan: Hemoglobin    (Z13.220) Screening for cholesterol level  Plan: Lipid Profile (Chol, Trig, HDL, LDL calc)    (F32.1) Current moderate episode of major depressive disorder without prior episode (H)  Plan: FLUoxetine (PROZAC) 10 MG capsule    Return to care in 1 year.  Jayson Ayala CNM

## 2025-06-12 LAB
C TRACH DNA SPEC QL PROBE+SIG AMP: NEGATIVE
N GONORRHOEA DNA SPEC QL NAA+PROBE: NEGATIVE
SPECIMEN TYPE: NORMAL
T PALLIDUM AB SER QL: NONREACTIVE

## 2025-06-16 LAB
BKR LAB AP GYN ADEQUACY: NORMAL
BKR LAB AP GYN INTERPRETATION: NORMAL
BKR LAB AP HPV REFLEX: NORMAL
BKR LAB AP PREVIOUS ABNL DX: NORMAL
BKR LAB AP PREVIOUS ABNORMAL: NORMAL
PATH REPORT.COMMENTS IMP SPEC: NORMAL
PATH REPORT.COMMENTS IMP SPEC: NORMAL
PATH REPORT.RELEVANT HX SPEC: NORMAL